# Patient Record
Sex: FEMALE | Race: WHITE | URBAN - NONMETROPOLITAN AREA
[De-identification: names, ages, dates, MRNs, and addresses within clinical notes are randomized per-mention and may not be internally consistent; named-entity substitution may affect disease eponyms.]

---

## 2018-08-29 ENCOUNTER — DOCTOR'S OFFICE (OUTPATIENT)
Dept: URBAN - NONMETROPOLITAN AREA CLINIC 1 | Facility: CLINIC | Age: 48
Setting detail: OPHTHALMOLOGY
End: 2018-08-29
Payer: COMMERCIAL

## 2018-08-29 DIAGNOSIS — H52.4: ICD-10-CM

## 2018-08-29 DIAGNOSIS — H52.13: ICD-10-CM

## 2018-08-29 PROCEDURE — 92015 DETERMINE REFRACTIVE STATE: CPT | Performed by: OPTOMETRIST

## 2018-08-29 PROCEDURE — SELFPAYVIS VISION VISIT SELF PAY: Performed by: OPTOMETRIST

## 2018-08-29 ASSESSMENT — REFRACTION_MANIFEST
OD_SPHERE: -3.25
OS_ADD: +2.00
OU_VA: 20/
OS_VA3: 20/
OS_VA2: 20/20
OD_ADD: +2.00
OS_VA3: 20/
OS_VA1: 20/20
OD_VA3: 20/
OS_VPRISM: BU
OD_VA1: 20/20
OD_VA2: 20/20
OD_VA3: 20/
OS_AXIS: 180
OU_VA: 20/
OD_VA2: 20/
OS_VA1: 20/
OS_CYLINDER: -0.25
OD_CYLINDER: SPH
OS_SPHERE: -2.75
OD_VA1: 20/
OS_VA2: 20/

## 2018-08-29 ASSESSMENT — REFRACTION_AUTOREFRACTION
OD_SPHERE: -3.25
OS_SPHERE: -2.50
OS_CYLINDER: -0.50
OD_CYLINDER: SPH
OS_AXIS: 021

## 2018-08-29 ASSESSMENT — REFRACTION_CURRENTRX
OD_ADD: +1.00
OD_AXIS: 166
OS_SPHERE: -2.75
OS_OVR_VA: 20/
OS_CYLINDER: -0.50
OS_AXIS: 002
OS_OVR_VA: 20/
OD_VPRISM_DIRECTION: SV
OD_CYLINDER: -0.25
OS_VPRISM_DIRECTION: SV
OS_OVR_VA: 20/
OS_SPHERE: -2.25
OS_AXIS: 009
OD_OVR_VA: 20/
OD_SPHERE: -2.75
OD_AXIS: 171
OD_CYLINDER: -0.25
OS_ADD: +1.00
OD_OVR_VA: 20/
OS_VPRISM_DIRECTION: PROGS
OD_SPHERE: -3.00
OD_VPRISM_DIRECTION: PROGS
OS_CYLINDER: -0.25
OD_OVR_VA: 20/

## 2018-08-29 ASSESSMENT — VISUAL ACUITY
OS_BCVA: 20/25
OD_BCVA: 20/20

## 2018-08-29 ASSESSMENT — SPHEQUIV_DERIVED
OS_SPHEQUIV: -2.875
OS_SPHEQUIV: -2.75

## 2019-09-06 ENCOUNTER — DOCTOR'S OFFICE (OUTPATIENT)
Dept: URBAN - NONMETROPOLITAN AREA CLINIC 1 | Facility: CLINIC | Age: 49
Setting detail: OPHTHALMOLOGY
End: 2019-09-06
Payer: COMMERCIAL

## 2019-09-06 DIAGNOSIS — H52.4: ICD-10-CM

## 2019-09-06 DIAGNOSIS — H52.13: ICD-10-CM

## 2019-09-06 PROCEDURE — 92014 COMPRE OPH EXAM EST PT 1/>: CPT | Performed by: OPTOMETRIST

## 2019-09-06 ASSESSMENT — REFRACTION_MANIFEST
OD_VA3: 20/
OD_VA1: 20/
OD_SPHERE: -3.00
OS_ADD: +2.25
OS_VA2: 20/
OS_VPRISM: BU
OS_VA2: 20/20
OD_CYLINDER: -0.25
OD_ADD: +2.25
OS_SPHERE: -2.75
OD_VA2: 20/
OS_VA1: 20/
OU_VA: 20/
OU_VA: 20/
OD_AXIS: 120
OD_VA1: 20/20
OS_AXIS: 180
OD_VA3: 20/
OS_VA3: 20/
OD_VA2: 20/20
OS_VA1: 20/20
OS_CYLINDER: -0.25
OS_VA3: 20/

## 2019-09-06 ASSESSMENT — REFRACTION_CURRENTRX
OS_OVR_VA: 20/
OD_ADD: +1.00
OD_SPHERE: -2.75
OS_CYLINDER: -0.25
OS_AXIS: 180
OS_AXIS: 009
OD_OVR_VA: 20/
OS_CYLINDER: -0.50
OD_CYLINDER: SPH
OS_VPRISM_DIRECTION: PROGS
OS_SPHERE: -2.25
OD_OVR_VA: 20/
OD_CYLINDER: -0.25
OD_VPRISM_DIRECTION: SV
OS_OVR_VA: 20/
OD_SPHERE: -3.25
OD_AXIS: 171
OS_SPHERE: -2.75
OS_OVR_VA: 20/
OS_VPRISM_DIRECTION: SV
OD_OVR_VA: 20/
OS_ADD: +1.00
OD_VPRISM_DIRECTION: PROGS

## 2019-09-06 ASSESSMENT — SPHEQUIV_DERIVED
OD_SPHEQUIV: -3.125
OS_SPHEQUIV: -2.5
OD_SPHEQUIV: -2.75
OS_SPHEQUIV: -2.875

## 2019-09-06 ASSESSMENT — VISUAL ACUITY
OD_BCVA: 20/15-2
OS_BCVA: 20/20-1

## 2019-09-06 ASSESSMENT — REFRACTION_AUTOREFRACTION
OD_AXIS: 117
OD_SPHERE: -2.50
OS_SPHERE: -2.25
OS_CYLINDER: -0.50
OS_AXIS: 177
OD_CYLINDER: -0.50

## 2019-09-06 ASSESSMENT — CONFRONTATIONAL VISUAL FIELD TEST (CVF)
OS_FINDINGS: FULL
OD_FINDINGS: FULL

## 2020-08-07 ENCOUNTER — DOCTOR'S OFFICE (OUTPATIENT)
Dept: URBAN - NONMETROPOLITAN AREA CLINIC 1 | Facility: CLINIC | Age: 50
Setting detail: OPHTHALMOLOGY
End: 2020-08-07
Payer: MEDICARE

## 2020-08-07 DIAGNOSIS — L03.213: ICD-10-CM

## 2020-08-07 DIAGNOSIS — H00.12: ICD-10-CM

## 2020-08-07 PROCEDURE — 92014 COMPRE OPH EXAM EST PT 1/>: CPT | Performed by: OPHTHALMOLOGY

## 2020-08-07 ASSESSMENT — REFRACTION_CURRENTRX
OS_CYLINDER: -0.50
OD_OVR_VA: 20/
OS_SPHERE: -2.25
OS_AXIS: 009
OD_ADD: +1.00
OD_AXIS: 171
OD_VPRISM_DIRECTION: SV
OD_CYLINDER: -0.25
OS_CYLINDER: -0.25
OS_SPHERE: -2.75
OS_AXIS: 180
OS_VPRISM_DIRECTION: SV
OD_OVR_VA: 20/
OS_VPRISM_DIRECTION: PROGS
OS_ADD: +1.00
OD_SPHERE: -3.25
OD_VPRISM_DIRECTION: PROGS
OS_OVR_VA: 20/
OD_CYLINDER: SPH
OD_SPHERE: -2.75
OS_OVR_VA: 20/

## 2020-08-07 ASSESSMENT — CONFRONTATIONAL VISUAL FIELD TEST (CVF)
OD_FINDINGS: FULL
OS_FINDINGS: FULL

## 2020-08-07 ASSESSMENT — LID EXAM ASSESSMENTS
OS_COMMENTS: +1 MGD
OD_COMMENTS: +1 MGD

## 2020-08-07 ASSESSMENT — VISUAL ACUITY
OS_BCVA: 20/25
OD_BCVA: 20/15-2

## 2020-08-07 ASSESSMENT — REFRACTION_AUTOREFRACTION
OD_CYLINDER: -0.50
OD_AXIS: 117
OS_AXIS: 177
OS_CYLINDER: -0.50
OD_SPHERE: -2.50
OS_SPHERE: -2.25

## 2020-08-07 ASSESSMENT — REFRACTION_MANIFEST
OS_ADD: +2.25
OS_CYLINDER: -0.25
OD_AXIS: 120
OS_VPRISM: BU
OD_VA1: 20/20
OS_VA2: 20/20
OS_SPHERE: -2.75
OD_CYLINDER: -0.25
OS_VA1: 20/20
OD_SPHERE: -3.00
OD_ADD: +2.25
OS_AXIS: 180
OD_VA2: 20/20

## 2020-08-07 ASSESSMENT — SPHEQUIV_DERIVED
OD_SPHEQUIV: -2.75
OS_SPHEQUIV: -2.875
OD_SPHEQUIV: -3.125
OS_SPHEQUIV: -2.5

## 2020-08-21 ENCOUNTER — DOCTOR'S OFFICE (OUTPATIENT)
Dept: URBAN - NONMETROPOLITAN AREA CLINIC 1 | Facility: CLINIC | Age: 50
Setting detail: OPHTHALMOLOGY
End: 2020-08-21
Payer: MEDICARE

## 2020-08-21 DIAGNOSIS — L03.213: ICD-10-CM

## 2020-08-21 DIAGNOSIS — H00.12: ICD-10-CM

## 2020-08-21 PROCEDURE — 92012 INTRM OPH EXAM EST PATIENT: CPT | Performed by: OPHTHALMOLOGY

## 2020-08-21 ASSESSMENT — REFRACTION_AUTOREFRACTION
OD_SPHERE: -2.50
OS_AXIS: 177
OD_CYLINDER: -0.50
OD_AXIS: 117
OS_CYLINDER: -0.50
OS_SPHERE: -2.25

## 2020-08-21 ASSESSMENT — SPHEQUIV_DERIVED
OS_SPHEQUIV: -2.5
OS_SPHEQUIV: -2.875
OD_SPHEQUIV: -3.125
OD_SPHEQUIV: -2.75

## 2020-08-21 ASSESSMENT — REFRACTION_MANIFEST
OS_ADD: +2.25
OS_VA2: 20/20
OD_AXIS: 120
OD_VA1: 20/20
OS_VA1: 20/20
OS_CYLINDER: -0.25
OS_AXIS: 180
OS_VPRISM: BU
OD_SPHERE: -3.00
OD_ADD: +2.25
OS_SPHERE: -2.75
OD_VA2: 20/20
OD_CYLINDER: -0.25

## 2020-08-21 ASSESSMENT — REFRACTION_CURRENTRX
OD_VPRISM_DIRECTION: SV
OS_AXIS: 009
OS_ADD: +1.00
OD_SPHERE: -2.75
OS_SPHERE: -2.25
OD_CYLINDER: -0.25
OS_CYLINDER: -0.50
OD_CYLINDER: SPH
OS_CYLINDER: -0.25
OS_OVR_VA: 20/
OD_ADD: +1.00
OD_OVR_VA: 20/
OD_AXIS: 171
OD_OVR_VA: 20/
OD_SPHERE: -3.25
OS_SPHERE: -2.75
OS_VPRISM_DIRECTION: PROGS
OS_OVR_VA: 20/
OS_AXIS: 180
OD_VPRISM_DIRECTION: PROGS
OS_VPRISM_DIRECTION: SV

## 2020-08-21 ASSESSMENT — CONFRONTATIONAL VISUAL FIELD TEST (CVF)
OD_FINDINGS: FULL
OS_FINDINGS: FULL

## 2020-08-21 ASSESSMENT — VISUAL ACUITY
OS_BCVA: 20/20
OD_BCVA: 20/15

## 2020-08-21 ASSESSMENT — LID EXAM ASSESSMENTS
OD_COMMENTS: TR MGD
OS_COMMENTS: +1 MGD

## 2020-10-02 ENCOUNTER — DOCTOR'S OFFICE (OUTPATIENT)
Dept: URBAN - NONMETROPOLITAN AREA CLINIC 1 | Facility: CLINIC | Age: 50
Setting detail: OPHTHALMOLOGY
End: 2020-10-02
Payer: COMMERCIAL

## 2020-10-02 ENCOUNTER — RX ONLY (RX ONLY)
Age: 50
End: 2020-10-02

## 2020-10-02 DIAGNOSIS — H40.041: ICD-10-CM

## 2020-10-02 DIAGNOSIS — H00.12: ICD-10-CM

## 2020-10-02 PROBLEM — L03.213 PERIORBITAL CELLULITIS: Status: RESOLVED | Noted: 2020-08-07 | Resolved: 2020-10-02

## 2020-10-02 PROCEDURE — 99212 OFFICE O/P EST SF 10 MIN: CPT | Performed by: OPHTHALMOLOGY

## 2020-10-02 ASSESSMENT — VISUAL ACUITY
OS_BCVA: 20/25+1
OD_BCVA: 20/20+2

## 2020-10-02 ASSESSMENT — REFRACTION_AUTOREFRACTION
OD_CYLINDER: -0.50
OD_SPHERE: -2.50
OD_AXIS: 117
OS_SPHERE: -2.25
OS_AXIS: 177
OS_CYLINDER: -0.50

## 2020-10-02 ASSESSMENT — REFRACTION_CURRENTRX
OS_SPHERE: -2.75
OS_OVR_VA: 20/
OS_ADD: +1.75
OD_SPHERE: -3.25
OS_VPRISM_DIRECTION: PROGS
OS_AXIS: 180
OD_AXIS: 171
OD_VPRISM_DIRECTION: PROGS
OD_SPHERE: -2.75
OD_ADD: +1.25
OS_VPRISM_DIRECTION: PROGS
OS_SPHERE: -2.25
OS_OVR_VA: 20/
OD_ADD: +1.00
OS_CYLINDER: -0.50
OD_AXIS: 157
OS_AXIS: 009
OD_VPRISM_DIRECTION: PROGS
OD_OVR_VA: 20/
OD_CYLINDER: -0.25
OS_ADD: +1.00
OS_CYLINDER: -0.50
OD_CYLINDER: -0.50
OD_OVR_VA: 20/

## 2020-10-02 ASSESSMENT — REFRACTION_MANIFEST
OD_VA1: 20/20
OS_AXIS: 180
OS_ADD: +2.25
OD_SPHERE: -3.00
OS_VA2: 20/20
OD_VA2: 20/20
OS_SPHERE: -2.75
OS_CYLINDER: -0.25
OD_AXIS: 120
OS_VA1: 20/20
OD_CYLINDER: -0.25
OD_ADD: +2.25
OS_VPRISM: BU

## 2020-10-02 ASSESSMENT — CONFRONTATIONAL VISUAL FIELD TEST (CVF)
OS_FINDINGS: FULL
OD_FINDINGS: FULL

## 2020-10-02 ASSESSMENT — SPHEQUIV_DERIVED
OD_SPHEQUIV: -3.125
OS_SPHEQUIV: -2.5
OD_SPHEQUIV: -2.75
OS_SPHEQUIV: -2.875

## 2020-10-02 ASSESSMENT — LID EXAM ASSESSMENTS
OS_BLEPHARITIS: T
OS_COMMENTS: +1 MGD
OD_BLEPHARITIS: T

## 2020-10-20 ENCOUNTER — RX ONLY (RX ONLY)
Age: 50
End: 2020-10-20

## 2020-10-20 ENCOUNTER — DOCTOR'S OFFICE (OUTPATIENT)
Dept: URBAN - NONMETROPOLITAN AREA CLINIC 1 | Facility: CLINIC | Age: 50
Setting detail: OPHTHALMOLOGY
End: 2020-10-20
Payer: COMMERCIAL

## 2020-10-20 DIAGNOSIS — H00.12: ICD-10-CM

## 2020-10-20 DIAGNOSIS — H40.041: ICD-10-CM

## 2020-10-20 PROCEDURE — 92012 INTRM OPH EXAM EST PATIENT: CPT | Performed by: OPHTHALMOLOGY

## 2020-10-20 ASSESSMENT — REFRACTION_CURRENTRX
OS_CYLINDER: -0.50
OD_VPRISM_DIRECTION: PROGS
OD_SPHERE: -3.25
OD_ADD: +1.25
OS_AXIS: 009
OS_VPRISM_DIRECTION: PROGS
OS_ADD: +1.75
OS_OVR_VA: 20/
OS_ADD: +1.00
OD_CYLINDER: -0.50
OS_SPHERE: -2.75
OD_AXIS: 157
OD_OVR_VA: 20/
OD_CYLINDER: -0.25
OD_SPHERE: -2.75
OS_VPRISM_DIRECTION: PROGS
OD_ADD: +1.00
OD_AXIS: 171
OD_VPRISM_DIRECTION: PROGS
OS_CYLINDER: -0.50
OS_OVR_VA: 20/
OD_OVR_VA: 20/
OS_AXIS: 180
OS_SPHERE: -2.25

## 2020-10-20 ASSESSMENT — REFRACTION_MANIFEST
OD_VA2: 20/20
OD_AXIS: 120
OD_SPHERE: -3.00
OD_VA1: 20/20
OS_VA2: 20/20
OD_CYLINDER: -0.25
OS_SPHERE: -2.75
OS_AXIS: 180
OS_VPRISM: BU
OS_VA1: 20/20
OS_CYLINDER: -0.25
OD_ADD: +2.25
OS_ADD: +2.25

## 2020-10-20 ASSESSMENT — CONFRONTATIONAL VISUAL FIELD TEST (CVF)
OD_FINDINGS: FULL
OS_FINDINGS: FULL

## 2020-10-20 ASSESSMENT — TONOMETRY
OS_IOP_MMHG: 13
OD_IOP_MMHG: 16

## 2020-10-20 ASSESSMENT — SPHEQUIV_DERIVED
OD_SPHEQUIV: -2.75
OD_SPHEQUIV: -3.125
OS_SPHEQUIV: -2.5
OS_SPHEQUIV: -2.875

## 2020-10-20 ASSESSMENT — REFRACTION_AUTOREFRACTION
OD_CYLINDER: -0.50
OS_CYLINDER: -0.50
OS_AXIS: 177
OD_AXIS: 117
OS_SPHERE: -2.25
OD_SPHERE: -2.50

## 2020-10-20 ASSESSMENT — LID EXAM ASSESSMENTS
OD_BLEPHARITIS: T
OS_BLEPHARITIS: T

## 2020-10-20 ASSESSMENT — VISUAL ACUITY
OD_BCVA: 20/20-1
OS_BCVA: 20/20-2

## 2020-11-11 ENCOUNTER — DOCTOR'S OFFICE (OUTPATIENT)
Dept: URBAN - NONMETROPOLITAN AREA CLINIC 1 | Facility: CLINIC | Age: 50
Setting detail: OPHTHALMOLOGY
End: 2020-11-11
Payer: COMMERCIAL

## 2020-11-11 DIAGNOSIS — H52.13: ICD-10-CM

## 2020-11-11 DIAGNOSIS — H52.4: ICD-10-CM

## 2020-11-11 PROCEDURE — 92012 INTRM OPH EXAM EST PATIENT: CPT | Performed by: OPTOMETRIST

## 2020-11-11 PROCEDURE — 92015 DETERMINE REFRACTIVE STATE: CPT | Performed by: OPTOMETRIST

## 2020-11-11 ASSESSMENT — REFRACTION_CURRENTRX
OD_AXIS: 157
OS_ADD: +1.75
OD_AXIS: 171
OS_VPRISM_DIRECTION: PROGS
OS_SPHERE: -2.25
OS_CYLINDER: -0.50
OD_OVR_VA: 20/
OD_SPHERE: -2.75
OD_OVR_VA: 20/
OD_VPRISM_DIRECTION: PROGS
OD_VPRISM_DIRECTION: PROGS
OD_SPHERE: -3.25
OS_ADD: +1.00
OD_CYLINDER: -0.25
OD_ADD: +1.25
OS_OVR_VA: 20/
OS_OVR_VA: 20/
OS_AXIS: 180
OS_VPRISM_DIRECTION: PROGS
OS_SPHERE: -2.75
OD_ADD: +1.00
OD_CYLINDER: -0.50
OS_CYLINDER: -0.50
OS_AXIS: 009

## 2020-11-11 ASSESSMENT — REFRACTION_MANIFEST
OD_SPHERE: -3.00
OS_VPRISM: BU
OD_CYLINDER: -0.25
OD_VA2: 20/20
OS_VA1: 20/20
OS_VA2: 20/20
OD_VA1: 20/20
OS_CYLINDER: -0.25
OS_AXIS: 180
OD_ADD: +2.25
OS_SPHERE: -2.75
OS_ADD: +2.25
OD_AXIS: 120

## 2020-11-11 ASSESSMENT — SPHEQUIV_DERIVED
OS_SPHEQUIV: -1.5
OS_SPHEQUIV: -2.875
OD_SPHEQUIV: -3.25
OD_SPHEQUIV: -3.125

## 2020-11-11 ASSESSMENT — VISUAL ACUITY
OS_BCVA: 20/15-1
OD_BCVA: 20/15-1

## 2020-11-11 ASSESSMENT — REFRACTION_AUTOREFRACTION
OD_SPHERE: -3.25
OS_SPHERE: -1.25
OD_AXIS: 180
OS_CYLINDER: -0.50
OS_AXIS: 25
OD_CYLINDER: 0.00

## 2021-10-29 ENCOUNTER — DOCTOR'S OFFICE (OUTPATIENT)
Dept: URBAN - NONMETROPOLITAN AREA CLINIC 1 | Facility: CLINIC | Age: 51
Setting detail: OPHTHALMOLOGY
End: 2021-10-29
Payer: COMMERCIAL

## 2021-10-29 DIAGNOSIS — H02.88A: ICD-10-CM

## 2021-10-29 DIAGNOSIS — H01.002: ICD-10-CM

## 2021-10-29 DIAGNOSIS — H01.004: ICD-10-CM

## 2021-10-29 DIAGNOSIS — H01.001: ICD-10-CM

## 2021-10-29 DIAGNOSIS — H10.13: ICD-10-CM

## 2021-10-29 DIAGNOSIS — H01.005: ICD-10-CM

## 2021-10-29 DIAGNOSIS — H02.88B: ICD-10-CM

## 2021-10-29 DIAGNOSIS — H40.043: ICD-10-CM

## 2021-10-29 DIAGNOSIS — H40.041: ICD-10-CM

## 2021-10-29 PROCEDURE — 92014 COMPRE OPH EXAM EST PT 1/>: CPT | Performed by: OPHTHALMOLOGY

## 2021-10-29 ASSESSMENT — KERATOMETRY
OS_K1POWER_DIOPTERS: 44.25
OS_AXISANGLE_DEGREES: 73
OD_K2POWER_DIOPTERS: 44.50
OD_K1POWER_DIOPTERS: 44.50
OS_K2POWER_DIOPTERS: 44.75
OD_AXISANGLE_DEGREES: 90

## 2021-10-29 ASSESSMENT — REFRACTION_CURRENTRX
OS_OVR_VA: 20/
OS_AXIS: 008
OS_OVR_VA: 20/
OD_OVR_VA: 20/
OD_VPRISM_DIRECTION: PROGS
OD_OVR_VA: 20/
OD_SPHERE: -3.25
OD_ADD: +1.50
OS_SPHERE: -2.75
OS_VPRISM_DIRECTION: PROGS
OS_AXIS: 009
OS_SPHERE: -2.25
OS_ADD: +1.50
OD_AXIS: 166
OS_ADD: +1.00
OD_ADD: +1.00
OS_CYLINDER: -0.25
OD_CYLINDER: -0.25
OD_AXIS: 171
OD_VPRISM_DIRECTION: PROGS
OD_CYLINDER: -0.25
OS_VPRISM_DIRECTION: PROGS
OS_CYLINDER: -0.50
OD_SPHERE: -2.75

## 2021-10-29 ASSESSMENT — REFRACTION_MANIFEST
OS_VA2: 20/20
OS_AXIS: 180
OD_VA1: 20/20
OS_ADD: +2.25
OS_VPRISM: BU
OD_VA2: 20/20
OD_CYLINDER: -0.25
OD_SPHERE: -3.00
OD_ADD: +2.25
OD_AXIS: 120
OS_SPHERE: -2.75
OS_CYLINDER: -0.25
OS_VA1: 20/20

## 2021-10-29 ASSESSMENT — SPHEQUIV_DERIVED
OS_SPHEQUIV: -2.875
OD_SPHEQUIV: -3.25
OD_SPHEQUIV: -3.125
OS_SPHEQUIV: -3

## 2021-10-29 ASSESSMENT — REFRACTION_AUTOREFRACTION
OS_CYLINDER: 0.00
OS_SPHERE: -3.00
OS_AXIS: 0.00
OD_SPHERE: -3.25
OD_AXIS: 0.00
OD_CYLINDER: 0.00

## 2021-10-29 ASSESSMENT — VISUAL ACUITY
OS_BCVA: 20/20
OD_BCVA: 20/20-1

## 2021-10-29 ASSESSMENT — AXIALLENGTH_DERIVED
OS_AL: 24.4216
OD_AL: 24.5264
OD_AL: 24.4739
OS_AL: 24.3695

## 2021-10-29 ASSESSMENT — LID EXAM ASSESSMENTS
OS_BLEPHARITIS: T
OD_BLEPHARITIS: T

## 2021-10-29 ASSESSMENT — CONFRONTATIONAL VISUAL FIELD TEST (CVF)
OD_FINDINGS: FULL
OS_FINDINGS: FULL

## 2022-01-28 ENCOUNTER — DOCTOR'S OFFICE (OUTPATIENT)
Dept: URBAN - NONMETROPOLITAN AREA CLINIC 1 | Facility: CLINIC | Age: 52
Setting detail: OPHTHALMOLOGY
End: 2022-01-28
Payer: COMMERCIAL

## 2022-01-28 DIAGNOSIS — H52.4: ICD-10-CM

## 2022-01-28 DIAGNOSIS — H52.13: ICD-10-CM

## 2022-01-28 PROCEDURE — 92012 INTRM OPH EXAM EST PATIENT: CPT | Performed by: OPHTHALMOLOGY

## 2022-05-06 ASSESSMENT — REFRACTION_MANIFEST
OS_VPRISM: BU
OD_CYLINDER: -0.50
OD_AXIS: 125
OS_AXIS: 000
OS_SPHERE: -3.00
OS_VA2: 20/20
OD_VA1: 20/20
OS_VA1: 20/20
OU_VA: 20/20
OS_CYLINDER: 0.00
OD_SPHERE: -3.00
OS_ADD: +2.25
OD_ADD: +2.25
OD_VA2: 20/20

## 2022-05-06 ASSESSMENT — REFRACTION_CURRENTRX
OD_OVR_VA: 20/
OS_SPHERE: -2.75
OD_CYLINDER: -0.50
OD_SPHERE: -2.75
OS_CYLINDER: -0.50
OS_AXIS: 009
OD_ADD: +1.00
OS_ADD: +1.00
OD_AXIS: 171
OS_VPRISM_DIRECTION: PROGS
OD_ADD: +1.25
OD_OVR_VA: 20/
OD_AXIS: 170
OD_VPRISM_DIRECTION: PROGS
OD_SPHERE: -3.00
OS_AXIS: 178
OS_SPHERE: -2.25
OS_VPRISM_DIRECTION: PROGS
OD_CYLINDER: -0.25
OS_ADD: +1.25
OS_CYLINDER: -0.50
OS_OVR_VA: 20/
OS_OVR_VA: 20/
OD_VPRISM_DIRECTION: PROGS

## 2022-05-06 ASSESSMENT — REFRACTION_AUTOREFRACTION
OD_AXIS: 148
OD_SPHERE: -2.50
OS_AXIS: 159
OD_CYLINDER: -1.00
OS_CYLINDER: -0.75
OS_SPHERE: -2.50

## 2022-05-06 ASSESSMENT — KERATOMETRY
OD_K2POWER_DIOPTERS: 44.50
OD_K1POWER_DIOPTERS: 44.50
OS_K2POWER_DIOPTERS: 44.75
OD_AXISANGLE_DEGREES: 90
OS_AXISANGLE_DEGREES: 73
OS_K1POWER_DIOPTERS: 44.25

## 2022-05-06 ASSESSMENT — AXIALLENGTH_DERIVED
OS_AL: 24.4216
OS_AL: 24.3695
OD_AL: 24.5264
OD_AL: 24.4216

## 2022-05-06 ASSESSMENT — SPHEQUIV_DERIVED
OD_SPHEQUIV: -3
OS_SPHEQUIV: -2.875
OD_SPHEQUIV: -3.25
OS_SPHEQUIV: -3

## 2022-05-06 ASSESSMENT — VISUAL ACUITY
OS_BCVA: 20/25-1
OD_BCVA: 20/20-2

## 2022-08-18 ENCOUNTER — DOCTOR'S OFFICE (OUTPATIENT)
Dept: URBAN - NONMETROPOLITAN AREA CLINIC 1 | Facility: CLINIC | Age: 52
Setting detail: OPHTHALMOLOGY
End: 2022-08-18
Payer: COMMERCIAL

## 2022-08-18 DIAGNOSIS — G43.101: ICD-10-CM

## 2022-08-18 DIAGNOSIS — H53.2: ICD-10-CM

## 2022-08-18 DIAGNOSIS — H40.001: ICD-10-CM

## 2022-08-18 PROCEDURE — 92083 EXTENDED VISUAL FIELD XM: CPT | Performed by: OPHTHALMOLOGY

## 2022-08-18 PROCEDURE — 76514 ECHO EXAM OF EYE THICKNESS: CPT | Performed by: OPHTHALMOLOGY

## 2022-08-18 PROCEDURE — 99213 OFFICE O/P EST LOW 20 MIN: CPT | Performed by: OPHTHALMOLOGY

## 2022-08-18 ASSESSMENT — REFRACTION_CURRENTRX
OD_CYLINDER: -0.25
OS_OVR_VA: 20/
OD_SPHERE: -3.00
OS_CYLINDER: -0.50
OS_AXIS: 178
OD_OVR_VA: 20/
OD_AXIS: 170
OS_VPRISM_DIRECTION: PROGS
OD_OVR_VA: 20/
OS_CYLINDER: -0.50
OS_ADD: +1.25
OD_AXIS: 171
OS_VPRISM_DIRECTION: PROGS
OD_CYLINDER: -0.50
OS_SPHERE: -2.25
OS_OVR_VA: 20/
OD_VPRISM_DIRECTION: PROGS
OS_SPHERE: -2.75
OD_ADD: +1.25
OS_AXIS: 009
OS_ADD: +1.00
OD_VPRISM_DIRECTION: PROGS
OD_ADD: +1.00
OD_SPHERE: -2.75

## 2022-08-18 ASSESSMENT — REFRACTION_AUTOREFRACTION
OS_AXIS: 095
OD_AXIS: 178
OS_CYLINDER: +0.75
OD_SPHERE: -2.75
OD_CYLINDER: +0.50
OS_SPHERE: -3.00

## 2022-08-18 ASSESSMENT — SPHEQUIV_DERIVED
OD_SPHEQUIV: -2.5
OD_SPHEQUIV: -3.25
OS_SPHEQUIV: -3
OS_SPHEQUIV: -2.625

## 2022-08-18 ASSESSMENT — PACHYMETRY
OD_CT_UM: 515
OD_CT_CORRECTION: 2
OS_CT_CORRECTION: 1
OS_CT_UM: 520

## 2022-08-18 ASSESSMENT — REFRACTION_MANIFEST
OS_CYLINDER: 0.00
OD_SPHERE: -3.00
OD_VA2: 20/20
OS_VA1: 20/20
OD_ADD: +2.25
OD_VA1: 20/20
OU_VA: 20/20
OS_ADD: +2.25
OD_CYLINDER: -0.50
OS_AXIS: 000
OS_SPHERE: -3.00
OS_VPRISM: BU
OS_VA2: 20/20
OD_AXIS: 125

## 2022-08-18 ASSESSMENT — VISUAL ACUITY
OS_BCVA: 20/30-1
OD_BCVA: 20/20-2

## 2022-08-18 ASSESSMENT — CONFRONTATIONAL VISUAL FIELD TEST (CVF)
OD_FINDINGS: FULL
OS_FINDINGS: FULL

## 2022-08-18 ASSESSMENT — KERATOMETRY
OD_AXISANGLE_DEGREES: 90
OD_K2POWER_DIOPTERS: 44.50
OD_K1POWER_DIOPTERS: 44.50
OS_AXISANGLE_DEGREES: 73
OS_K2POWER_DIOPTERS: 44.75
OS_K1POWER_DIOPTERS: 44.25

## 2022-08-18 ASSESSMENT — AXIALLENGTH_DERIVED
OD_AL: 24.5264
OS_AL: 24.266
OS_AL: 24.4216
OD_AL: 24.2146

## 2022-08-18 ASSESSMENT — LID EXAM ASSESSMENTS
OS_BLEPHARITIS: T
OD_BLEPHARITIS: T

## 2022-09-26 ENCOUNTER — DOCTOR'S OFFICE (OUTPATIENT)
Dept: URBAN - NONMETROPOLITAN AREA CLINIC 1 | Facility: CLINIC | Age: 52
Setting detail: OPHTHALMOLOGY
End: 2022-09-26
Payer: COMMERCIAL

## 2022-09-26 DIAGNOSIS — H40.001: ICD-10-CM

## 2022-09-26 DIAGNOSIS — H40.043: ICD-10-CM

## 2022-09-26 DIAGNOSIS — H53.2: ICD-10-CM

## 2022-09-26 DIAGNOSIS — G43.101: ICD-10-CM

## 2022-09-26 DIAGNOSIS — H40.041: ICD-10-CM

## 2022-09-26 PROBLEM — H02.88A MEIBOMIAN GLAND DYSFUNCTION RIGHT EYE, UPPER AND LOWER EYELIDS: Status: ACTIVE | Noted: 2021-10-29

## 2022-09-26 PROBLEM — H52.13 MYOPIA; BOTH EYES: Status: ACTIVE | Noted: 2018-08-29

## 2022-09-26 PROBLEM — H50.21 HYPERTROPIA; RIGHT EYE: Status: ACTIVE | Noted: 2018-08-29

## 2022-09-26 PROBLEM — H01.002 BLEPHARITIS; RIGHT UPPER LID, RIGHT LOWER LID, LEFT UPPER LID, LEFT LOWER LID: Status: ACTIVE | Noted: 2021-10-29

## 2022-09-26 PROBLEM — H02.88B MEIBOMIAN GLAND DYSFUNCTION LEFT EYE, UPPER AND LOWER EYELIDS: Status: ACTIVE | Noted: 2021-10-29

## 2022-09-26 PROBLEM — H01.005 BLEPHARITIS; RIGHT UPPER LID, RIGHT LOWER LID, LEFT UPPER LID, LEFT LOWER LID: Status: ACTIVE | Noted: 2021-10-29

## 2022-09-26 PROBLEM — H10.13 ALLERGIC CONJUNCTIVITS; BOTH EYES: Status: ACTIVE | Noted: 2021-10-29

## 2022-09-26 PROBLEM — H01.001 BLEPHARITIS; RIGHT UPPER LID, RIGHT LOWER LID, LEFT UPPER LID, LEFT LOWER LID: Status: ACTIVE | Noted: 2021-10-29

## 2022-09-26 PROBLEM — H01.004 BLEPHARITIS; RIGHT UPPER LID, RIGHT LOWER LID, LEFT UPPER LID, LEFT LOWER LID: Status: ACTIVE | Noted: 2021-10-29

## 2022-09-26 PROCEDURE — 92133 CPTRZD OPH DX IMG PST SGM ON: CPT | Performed by: OPHTHALMOLOGY

## 2022-09-26 PROCEDURE — 99213 OFFICE O/P EST LOW 20 MIN: CPT | Performed by: OPHTHALMOLOGY

## 2022-09-26 ASSESSMENT — REFRACTION_MANIFEST
OD_AXIS: 125
OD_CYLINDER: -0.50
OS_CYLINDER: 0.00
OD_VA2: 20/20
OS_AXIS: 000
OS_VA2: 20/20
OU_VA: 20/20
OS_VPRISM: BU
OD_ADD: +2.25
OD_VA1: 20/20
OS_VA1: 20/20
OS_SPHERE: -3.00
OD_SPHERE: -3.00
OS_ADD: +2.25

## 2022-09-26 ASSESSMENT — REFRACTION_CURRENTRX
OS_AXIS: 009
OD_ADD: +1.25
OD_VPRISM_DIRECTION: PROGS
OD_CYLINDER: -0.50
OS_CYLINDER: -0.50
OD_CYLINDER: -0.25
OD_SPHERE: -2.75
OS_SPHERE: -2.25
OS_OVR_VA: 20/
OD_AXIS: 171
OD_SPHERE: -3.00
OD_AXIS: 170
OS_ADD: +1.25
OD_VPRISM_DIRECTION: PROGS
OD_OVR_VA: 20/
OS_VPRISM_DIRECTION: PROGS
OS_CYLINDER: -0.50
OD_OVR_VA: 20/
OS_ADD: +1.00
OS_VPRISM_DIRECTION: PROGS
OD_ADD: +1.00
OS_AXIS: 178
OS_SPHERE: -2.75
OS_OVR_VA: 20/

## 2022-09-26 ASSESSMENT — VISUAL ACUITY
OD_BCVA: 20/25
OS_BCVA: 20/25-2

## 2022-09-26 ASSESSMENT — REFRACTION_AUTOREFRACTION
OS_SPHERE: -3.00
OS_CYLINDER: +0.75
OD_CYLINDER: +0.50
OS_AXIS: 095
OD_AXIS: 178
OD_SPHERE: -2.75

## 2022-09-26 ASSESSMENT — AXIALLENGTH_DERIVED
OS_AL: 24.266
OD_AL: 24.5264
OS_AL: 24.4216
OD_AL: 24.2146

## 2022-09-26 ASSESSMENT — KERATOMETRY
OS_K2POWER_DIOPTERS: 44.75
OD_AXISANGLE_DEGREES: 90
OD_K1POWER_DIOPTERS: 44.50
OD_K2POWER_DIOPTERS: 44.50
OS_AXISANGLE_DEGREES: 73
OS_K1POWER_DIOPTERS: 44.25

## 2022-09-26 ASSESSMENT — LID EXAM ASSESSMENTS
OS_BLEPHARITIS: T
OD_BLEPHARITIS: T

## 2022-09-26 ASSESSMENT — SPHEQUIV_DERIVED
OD_SPHEQUIV: -2.5
OD_SPHEQUIV: -3.25
OS_SPHEQUIV: -2.625
OS_SPHEQUIV: -3

## 2022-09-26 ASSESSMENT — CONFRONTATIONAL VISUAL FIELD TEST (CVF)
OD_FINDINGS: FULL
OS_FINDINGS: FULL

## 2022-11-01 ENCOUNTER — DOCTOR'S OFFICE (OUTPATIENT)
Dept: URBAN - NONMETROPOLITAN AREA CLINIC 1 | Facility: CLINIC | Age: 52
Setting detail: OPHTHALMOLOGY
End: 2022-11-01
Payer: COMMERCIAL

## 2022-11-01 DIAGNOSIS — H40.001: ICD-10-CM

## 2022-11-01 DIAGNOSIS — H10.13: ICD-10-CM

## 2022-11-01 DIAGNOSIS — H01.002: ICD-10-CM

## 2022-11-01 DIAGNOSIS — H40.043: ICD-10-CM

## 2022-11-01 DIAGNOSIS — H02.88A: ICD-10-CM

## 2022-11-01 DIAGNOSIS — H01.004: ICD-10-CM

## 2022-11-01 DIAGNOSIS — H01.005: ICD-10-CM

## 2022-11-01 DIAGNOSIS — G43.101: ICD-10-CM

## 2022-11-01 DIAGNOSIS — H53.2: ICD-10-CM

## 2022-11-01 DIAGNOSIS — H02.88B: ICD-10-CM

## 2022-11-01 DIAGNOSIS — H01.001: ICD-10-CM

## 2022-11-01 PROCEDURE — 99213 OFFICE O/P EST LOW 20 MIN: CPT | Performed by: OPHTHALMOLOGY

## 2022-11-01 ASSESSMENT — KERATOMETRY
OD_K2POWER_DIOPTERS: 44.25
OS_AXISANGLE_DEGREES: 080
OS_K2POWER_DIOPTERS: 44.75
OS_K1POWER_DIOPTERS: 44.50
OD_K1POWER_DIOPTERS: 44.25

## 2022-11-01 ASSESSMENT — REFRACTION_CURRENTRX
OD_CYLINDER: -0.25
OD_SPHERE: -2.75
OD_AXIS: 170
OD_OVR_VA: 20/
OS_AXIS: 009
OS_SPHERE: -2.25
OS_ADD: +1.00
OD_OVR_VA: 20/
OD_CYLINDER: -0.50
OS_OVR_VA: 20/
OD_AXIS: 171
OS_VPRISM_DIRECTION: PROGS
OS_AXIS: 178
OS_OVR_VA: 20/
OD_ADD: +1.00
OS_SPHERE: -2.75
OS_CYLINDER: -0.50
OD_VPRISM_DIRECTION: PROGS
OD_SPHERE: -3.00
OD_ADD: +1.25
OS_VPRISM_DIRECTION: PROGS
OS_ADD: +1.25
OS_CYLINDER: -0.50
OD_VPRISM_DIRECTION: PROGS

## 2022-11-01 ASSESSMENT — REFRACTION_MANIFEST
OD_CYLINDER: -0.50
OU_VA: 20/20
OD_AXIS: 125
OS_SPHERE: -3.00
OS_VA2: 20/20
OS_AXIS: 000
OD_VA2: 20/20
OD_ADD: +2.25
OS_ADD: +2.25
OS_VA1: 20/20
OD_VA1: 20/20
OS_VPRISM: BU
OD_SPHERE: -3.00
OS_CYLINDER: 0.00

## 2022-11-01 ASSESSMENT — VISUAL ACUITY
OS_BCVA: 20/20
OD_BCVA: 20/20-1

## 2022-11-01 ASSESSMENT — REFRACTION_AUTOREFRACTION
OD_SPHERE: -2.50
OS_AXIS: 018
OS_SPHERE: -2.25
OD_AXIS: 148
OS_CYLINDER: -0.75
OD_CYLINDER: -0.75

## 2022-11-01 ASSESSMENT — AXIALLENGTH_DERIVED
OS_AL: 24.2176
OD_AL: 24.4679
OS_AL: 24.3725
OD_AL: 24.6261

## 2022-11-01 ASSESSMENT — CONFRONTATIONAL VISUAL FIELD TEST (CVF)
OS_FINDINGS: FULL
OD_FINDINGS: FULL

## 2022-11-01 ASSESSMENT — LID EXAM ASSESSMENTS
OD_BLEPHARITIS: T
OS_BLEPHARITIS: T

## 2022-11-01 ASSESSMENT — SPHEQUIV_DERIVED
OS_SPHEQUIV: -2.625
OD_SPHEQUIV: -2.875
OD_SPHEQUIV: -3.25
OS_SPHEQUIV: -3

## 2023-03-27 ENCOUNTER — DOCTOR'S OFFICE (OUTPATIENT)
Dept: URBAN - NONMETROPOLITAN AREA CLINIC 1 | Facility: CLINIC | Age: 53
Setting detail: OPHTHALMOLOGY
End: 2023-03-27
Payer: COMMERCIAL

## 2023-03-27 DIAGNOSIS — G43.101: ICD-10-CM

## 2023-03-27 DIAGNOSIS — H40.001: ICD-10-CM

## 2023-03-27 DIAGNOSIS — H53.2: ICD-10-CM

## 2023-03-27 DIAGNOSIS — H40.043: ICD-10-CM

## 2023-03-27 PROCEDURE — 99214 OFFICE O/P EST MOD 30 MIN: CPT | Performed by: OPHTHALMOLOGY

## 2023-03-27 PROCEDURE — 92250 FUNDUS PHOTOGRAPHY W/I&R: CPT | Performed by: OPHTHALMOLOGY

## 2023-03-27 ASSESSMENT — REFRACTION_MANIFEST
OU_VA: 20/20
OS_AXIS: 000
OS_ADD: +2.25
OS_VA1: 20/20
OD_VA2: 20/20
OD_CYLINDER: -0.50
OD_VA1: 20/20
OS_CYLINDER: 0.00
OS_VA2: 20/20
OD_SPHERE: -3.00
OS_SPHERE: -3.00
OS_VPRISM: BU
OD_AXIS: 125
OD_ADD: +2.25

## 2023-03-27 ASSESSMENT — REFRACTION_CURRENTRX
OS_AXIS: 180
OS_VPRISM_DIRECTION: PROGS
OD_OVR_VA: 20/
OD_VPRISM_DIRECTION: BF
OS_ADD: +1.00
OD_SPHERE: -3.25
OD_OVR_VA: 20/
OD_ADD: +1.25
OS_CYLINDER: -0.50
OD_CYLINDER: -0.50
OS_VPRISM_DIRECTION: PROGS
OS_AXIS: 178
OS_CYLINDER: 0.00
OD_ADD: +1.00
OD_SPHERE: -2.75
OD_VPRISM_DIRECTION: PROGS
OS_CYLINDER: -0.50
OD_SPHERE: -3.00
OD_CYLINDER: -0.25
OD_ADD: +2.25
OS_VPRISM_DIRECTION: BF
OS_OVR_VA: 20/
OS_ADD: +2.25
OS_OVR_VA: 20/
OS_SPHERE: -3.00
OS_OVR_VA: 20/
OD_AXIS: 171
OS_AXIS: 009
OD_AXIS: 170
OD_VPRISM_DIRECTION: PROGS
OD_AXIS: 123
OS_ADD: +1.25
OS_SPHERE: -2.75
OS_SPHERE: -2.25
OD_CYLINDER: -0.25
OD_OVR_VA: 20/

## 2023-03-27 ASSESSMENT — REFRACTION_AUTOREFRACTION
OD_SPHERE: -3.25
OS_AXIS: 002
OD_AXIS: 165
OS_CYLINDER: -0.75
OS_SPHERE: -2.50
OD_CYLINDER: -0.50

## 2023-03-27 ASSESSMENT — AXIALLENGTH_DERIVED
OD_AL: 24.7327
OS_AL: 24.3206
OD_AL: 24.6261
OS_AL: 24.3725

## 2023-03-27 ASSESSMENT — CONFRONTATIONAL VISUAL FIELD TEST (CVF)
OD_FINDINGS: FULL
OS_FINDINGS: FULL

## 2023-03-27 ASSESSMENT — VISUAL ACUITY
OD_BCVA: 20/20
OS_BCVA: 20/25-2

## 2023-03-27 ASSESSMENT — LID EXAM ASSESSMENTS
OD_BLEPHARITIS: T
OS_BLEPHARITIS: T

## 2023-03-27 ASSESSMENT — KERATOMETRY
OS_AXISANGLE_DEGREES: 080
OD_K1POWER_DIOPTERS: 44.25
OD_K2POWER_DIOPTERS: 44.25
OS_K1POWER_DIOPTERS: 44.50
OS_K2POWER_DIOPTERS: 44.75

## 2023-03-27 ASSESSMENT — SPHEQUIV_DERIVED
OS_SPHEQUIV: -3
OD_SPHEQUIV: -3.5
OD_SPHEQUIV: -3.25
OS_SPHEQUIV: -2.875

## 2023-04-17 ENCOUNTER — DOCTOR'S OFFICE (OUTPATIENT)
Dept: URBAN - NONMETROPOLITAN AREA CLINIC 1 | Facility: CLINIC | Age: 53
Setting detail: OPHTHALMOLOGY
End: 2023-04-17
Payer: COMMERCIAL

## 2023-04-17 DIAGNOSIS — H52.13: ICD-10-CM

## 2023-04-17 DIAGNOSIS — H52.4: ICD-10-CM

## 2023-04-17 PROCEDURE — 92012 INTRM OPH EXAM EST PATIENT: CPT | Performed by: OPTOMETRIST

## 2023-04-17 ASSESSMENT — REFRACTION_CURRENTRX
OD_VPRISM_DIRECTION: PROGS
OD_ADD: +2.25
OD_CYLINDER: -0.25
OD_VPRISM_DIRECTION: PROGS
OS_OVR_VA: 20/
OS_VPRISM_DIRECTION: PROGS
OD_ADD: +2.25
OD_AXIS: 125
OS_VPRISM_DIRECTION: PROGS
OS_VPRISM_DIRECTION: BF
OS_CYLINDER: -0.50
OS_AXIS: 180
OD_AXIS: 171
OS_OVR_VA: 20/
OS_ADD: +1.00
OS_AXIS: 009
OD_OVR_VA: 20/
OD_VPRISM_DIRECTION: BF
OS_SPHERE: -3.00
OD_OVR_VA: 20/
OS_CYLINDER: 0.00
OD_SPHERE: -3.00
OS_SPHERE: -3.00
OD_ADD: +1.00
OS_ADD: +2.25
OD_CYLINDER: -0.25
OS_ADD: +2.25
OD_OVR_VA: 20/
OS_OVR_VA: 20/
OD_SPHERE: -2.75
OS_CYLINDER: SPH
OS_SPHERE: -2.25
OD_CYLINDER: -0.50
OD_AXIS: 123
OD_SPHERE: -3.25

## 2023-04-17 ASSESSMENT — REFRACTION_MANIFEST
OS_SPHERE: -3.00
OS_VA2: 20/20
OD_VA2: 20/20
OS_CYLINDER: SPH
OD_ADD: +2.25
OD_AXIS: 125
OD_CYLINDER: -0.50
OD_VA1: 20/20
OS_ADD: +2.25
OS_VA1: 20/20-2
OD_SPHERE: -3.00
OU_VA: 20/20
OS_VPRISM: BU

## 2023-04-17 ASSESSMENT — AXIALLENGTH_DERIVED
OD_AL: 24.7327
OD_AL: 24.6261
OS_AL: 24.3206

## 2023-04-17 ASSESSMENT — KERATOMETRY
OD_K2POWER_DIOPTERS: 44.25
OS_K2POWER_DIOPTERS: 44.75
OS_K1POWER_DIOPTERS: 44.50
OS_AXISANGLE_DEGREES: 080
OD_K1POWER_DIOPTERS: 44.25

## 2023-04-17 ASSESSMENT — REFRACTION_AUTOREFRACTION
OS_CYLINDER: -0.75
OD_CYLINDER: -0.50
OD_SPHERE: -3.25
OD_AXIS: 165
OS_SPHERE: -2.50
OS_AXIS: 002

## 2023-04-17 ASSESSMENT — SPHEQUIV_DERIVED
OD_SPHEQUIV: -3.25
OD_SPHEQUIV: -3.5
OS_SPHEQUIV: -2.875

## 2023-04-17 ASSESSMENT — CONFRONTATIONAL VISUAL FIELD TEST (CVF)
OD_FINDINGS: FULL
OS_FINDINGS: FULL

## 2023-04-17 ASSESSMENT — VISUAL ACUITY
OS_BCVA: 20/25-2
OD_BCVA: 20/30-2

## 2023-04-17 ASSESSMENT — LID EXAM ASSESSMENTS
OD_BLEPHARITIS: T
OS_BLEPHARITIS: T

## 2023-06-08 ENCOUNTER — HOSPITAL ENCOUNTER (EMERGENCY)
Facility: HOSPITAL | Age: 53
Discharge: HOME | End: 2023-06-08
Attending: EMERGENCY MEDICINE | Admitting: EMERGENCY MEDICINE
Payer: COMMERCIAL

## 2023-06-08 VITALS
OXYGEN SATURATION: 100 % | TEMPERATURE: 97.7 F | RESPIRATION RATE: 18 BRPM | DIASTOLIC BLOOD PRESSURE: 87 MMHG | SYSTOLIC BLOOD PRESSURE: 125 MMHG | HEART RATE: 66 BPM

## 2023-06-08 DIAGNOSIS — S05.02XA ABRASION OF LEFT CORNEA, INITIAL ENCOUNTER: Primary | ICD-10-CM

## 2023-06-08 RX ORDER — TETRACAINE HYDROCHLORIDE 5 MG/ML
2 SOLUTION OPHTHALMIC ONCE
Status: COMPLETED | OUTPATIENT
Start: 2023-06-08 | End: 2023-06-08

## 2023-06-08 RX ORDER — TOBRAMYCIN 3 MG/ML
1 SOLUTION/ DROPS OPHTHALMIC ONCE
Status: COMPLETED | OUTPATIENT
Start: 2023-06-08 | End: 2023-06-08

## 2023-06-08 RX ORDER — TOBRAMYCIN 3 MG/ML
1 SOLUTION/ DROPS OPHTHALMIC
Qty: 5 ML | Refills: 0 | Status: SHIPPED | OUTPATIENT
Start: 2023-06-08

## 2023-06-08 RX ADMIN — FLUORESCEIN SODIUM 1 STRIP: 1 STRIP OPHTHALMIC at 18:43

## 2023-06-08 RX ADMIN — TETRACAINE HYDROCHLORIDE 2 DROP: 5 SOLUTION OPHTHALMIC at 18:43

## 2023-06-08 RX ADMIN — TOBRAMYCIN 1 DROP: 3 SOLUTION/ DROPS OPHTHALMIC at 18:45

## 2023-06-08 NOTE — ED PROVIDER NOTES
History  Chief Complaint   Patient presents with   • Eye Pain     Patient was stuck in left eye with a piece of hay  Patient having pain in left eye and headache       59-year-old female to the emergency department complaining of left eye pain and headache  About an hour prior to arrival, patient was struck in the left eye with a piece of hay  Denies change in vision  Just mostly complaining of the pain  No other injury  Eye Pain  Location:  OS  Quality:  Pain  Severity:  Moderate  Onset quality:  Sudden  Timing:  Constant  Chronicity:  New  Context:  Struck an eye with a piece of hay  Associated symptoms: headaches        None       History reviewed  No pertinent past medical history  History reviewed  No pertinent surgical history  History reviewed  No pertinent family history  I have reviewed and agree with the history as documented  E-Cigarette/Vaping   • E-Cigarette Use Never User      E-Cigarette/Vaping Substances     Social History     Tobacco Use   • Smoking status: Never   • Smokeless tobacco: Never   Vaping Use   • Vaping Use: Never used   Substance Use Topics   • Alcohol use: Not Currently   • Drug use: Not Currently       Review of Systems   Eyes: Positive for pain  Negative for photophobia, redness and visual disturbance  Neurological: Positive for headaches  All other systems reviewed and are negative  Physical Exam  Physical Exam  Vitals and nursing note reviewed  Constitutional:       General: She is not in acute distress  Appearance: She is obese  She is not toxic-appearing  HENT:      Right Ear: External ear normal       Left Ear: External ear normal       Nose: Nose normal       Mouth/Throat:      Mouth: Mucous membranes are moist    Eyes:      General: Lids are normal  Lids are everted, no foreign bodies appreciated  Vision grossly intact  Right eye: No discharge  Left eye: No discharge  Extraocular Movements: Extraocular movements intact  Right eye: Normal extraocular motion and no nystagmus  Left eye: Normal extraocular motion and no nystagmus  Conjunctiva/sclera:      Left eye: Left conjunctiva is not injected  No exudate  Pupils: Pupils are equal, round, and reactive to light  Left eye: Fluorescein uptake present  Slit lamp exam:     Right eye: Anterior chamber quiet  Left eye: Anterior chamber quiet  Visual Fields: Right eye visual fields normal and left eye visual fields normal         Comments: Linear abrasion as noted  Visual acuity in affected eyes 20/15  Pulmonary:      Effort: Pulmonary effort is normal  No respiratory distress  Abdominal:      General: Abdomen is flat  Neurological:      Mental Status: She is alert  Vital Signs  ED Triage Vitals [06/08/23 1830]   Temperature Pulse Respirations Blood Pressure SpO2   97 7 °F (36 5 °C) 66 18 125/87 100 %      Temp Source Heart Rate Source Patient Position - Orthostatic VS BP Location FiO2 (%)   Temporal Monitor Sitting Right arm --      Pain Score       --           Vitals:    06/08/23 1830   BP: 125/87   Pulse: 66   Patient Position - Orthostatic VS: Sitting         Visual Acuity      ED Medications  Medications   fluorescein sodium sterile ophthalmic strip 1 strip (1 strip Right Eye Given 6/8/23 1843)   tetracaine 0 5 % ophthalmic solution 2 drop (2 drops Right Eye Given 6/8/23 1843)   tobramycin (TOBREX) 0 3 % ophthalmic solution 1 drop (1 drop Left Eye Given 6/8/23 1845)       Diagnostic Studies  Results Reviewed     None                 No orders to display              Procedures  Procedures         ED Course                                             Medical Decision Making  Abrasion of left cornea, initial encounter: self-limited or minor problem  Risk  Prescription drug management            Disposition  Final diagnoses:   Abrasion of left cornea, initial encounter     Time reflects when diagnosis was documented in both MDM as applicable and the Disposition within this note     Time User Action Codes Description Comment    6/8/2023  6:44 PM Osman Aguilar Add [S05  02XA] Abrasion of left cornea, initial encounter       ED Disposition     ED Disposition   Discharge    Condition   Stable    Date/Time   Thu Jun 8, 2023  6:42 PM    Comment   Mehdi Woodall discharge to home/self care  Follow-up Information     Follow up With Specialties Details Why Contact Info    [facility] In 1 day for recheck [address] [facility]  [phone]            Patient's Medications   Discharge Prescriptions    TOBRAMYCIN (TOBREX) 0 3 % SOLN    Administer 1 drop into the left eye every 4 (four) hours while awake       Start Date: 6/8/2023  End Date: --       Order Dose: 1 drop       Quantity: 5 mL    Refills: 0       No discharge procedures on file      PDMP Review     None          ED Provider  Electronically Signed by           Mj Vazquez DO  06/08/23 4963

## 2023-06-08 NOTE — DISCHARGE INSTRUCTIONS
Use eyedrops as prescribed  Turn with any worsening  Follow-up with your ophthalmologist tomorrow  Thank you for choosing the emergency department at [facility]  We appreciated the opportunity and privilege to address your healthcare needs  We remain available to you should you require additional evaluation or assistance  We value your feedback and would appreciate the opportunity to address anything you identified as an opportunity to improve or where we excelled  If there are colleagues who deserve special recognition, please let us know! We hope you are feeling better soon! Please also note that sometimes there are subtle abnormalities in your lab values that you may observe when you access your record online  These are frequently not worrisome and if they are of concern we will have discussed them with you  However, we always encourage that you discuss any concerns you may have or observe on your record with your primary care provider  Please also be aware that voice transcription will occasionally recognize words or grammar differently than what was spoken

## 2023-06-09 ENCOUNTER — RX ONLY (RX ONLY)
Age: 53
End: 2023-06-09

## 2023-06-09 ENCOUNTER — DOCTOR'S OFFICE (OUTPATIENT)
Dept: URBAN - NONMETROPOLITAN AREA CLINIC 1 | Facility: CLINIC | Age: 53
Setting detail: OPHTHALMOLOGY
End: 2023-06-09
Payer: COMMERCIAL

## 2023-06-09 DIAGNOSIS — H11.89: ICD-10-CM

## 2023-06-09 PROCEDURE — 99214 OFFICE O/P EST MOD 30 MIN: CPT | Performed by: OPHTHALMOLOGY

## 2023-06-09 ASSESSMENT — REFRACTION_CURRENTRX
OD_ADD: +1.00
OD_AXIS: 125
OS_SPHERE: -2.25
OD_SPHERE: -3.25
OD_VPRISM_DIRECTION: BF
OD_AXIS: 123
OS_VPRISM_DIRECTION: PROGS
OD_CYLINDER: -0.25
OD_SPHERE: -2.75
OD_OVR_VA: 20/
OS_CYLINDER: -0.50
OD_VPRISM_DIRECTION: PROGS
OD_CYLINDER: -0.50
OS_OVR_VA: 20/
OS_SPHERE: -3.00
OD_ADD: +2.25
OS_OVR_VA: 20/
OS_AXIS: 009
OD_OVR_VA: 20/
OS_ADD: +2.25
OS_VPRISM_DIRECTION: PROGS
OD_ADD: +2.25
OD_AXIS: 171
OD_OVR_VA: 20/
OS_ADD: +1.00
OS_VPRISM_DIRECTION: BF
OS_ADD: +2.25
OD_VPRISM_DIRECTION: PROGS
OS_AXIS: 180
OD_SPHERE: -3.00
OS_OVR_VA: 20/
OS_CYLINDER: SPH
OD_CYLINDER: -0.25
OS_CYLINDER: 0.00
OS_SPHERE: -3.00

## 2023-06-09 ASSESSMENT — AXIALLENGTH_DERIVED: OD_AL: 24.4769

## 2023-06-09 ASSESSMENT — REFRACTION_MANIFEST
OS_VPRISM: BU
OS_SPHERE: -3.00
OD_VA2: 20/20
OD_AXIS: 125
OD_ADD: +2.25
OS_VA2: 20/20
OU_VA: 20/20
OD_CYLINDER: -0.50
OD_SPHERE: -3.00
OD_VA1: 20/20
OS_CYLINDER: SPH
OS_ADD: +2.25
OS_VA1: 20/20-2

## 2023-06-09 ASSESSMENT — KERATOMETRY
OS_AXISANGLE_DEGREES: 067
OS_K2POWER_DIOPTERS: 44.75
OD_AXISANGLE_DEGREES: 081
OD_K1POWER_DIOPTERS: 44.50
OS_K1POWER_DIOPTERS: 44.50
OD_K2POWER_DIOPTERS: 44.75

## 2023-06-09 ASSESSMENT — REFRACTION_AUTOREFRACTION
OS_SPHERE: --3.00
OS_CYLINDER: -0.25
OD_SPHERE: -3.50
OS_AXIS: 179

## 2023-06-09 ASSESSMENT — LID EXAM ASSESSMENTS
OD_BLEPHARITIS: T
OS_BLEPHARITIS: T

## 2023-06-09 ASSESSMENT — CONFRONTATIONAL VISUAL FIELD TEST (CVF)
OD_FINDINGS: FULL
OS_FINDINGS: FULL

## 2023-06-09 ASSESSMENT — VISUAL ACUITY
OS_BCVA: 20/20-2
OD_BCVA: 20/20

## 2023-06-09 ASSESSMENT — SPHEQUIV_DERIVED: OD_SPHEQUIV: -3.25

## 2023-09-27 ENCOUNTER — DOCTOR'S OFFICE (OUTPATIENT)
Dept: URBAN - NONMETROPOLITAN AREA CLINIC 1 | Facility: CLINIC | Age: 53
Setting detail: OPHTHALMOLOGY
End: 2023-09-27
Payer: COMMERCIAL

## 2023-09-27 DIAGNOSIS — H40.001: ICD-10-CM

## 2023-09-27 DIAGNOSIS — H11.89: ICD-10-CM

## 2023-09-27 DIAGNOSIS — H40.043: ICD-10-CM

## 2023-09-27 DIAGNOSIS — H53.2: ICD-10-CM

## 2023-09-27 DIAGNOSIS — H40.041: ICD-10-CM

## 2023-09-27 DIAGNOSIS — H11.153: ICD-10-CM

## 2023-09-27 PROCEDURE — 76514 ECHO EXAM OF EYE THICKNESS: CPT | Performed by: OPHTHALMOLOGY

## 2023-09-27 PROCEDURE — 99214 OFFICE O/P EST MOD 30 MIN: CPT | Performed by: OPHTHALMOLOGY

## 2023-09-27 PROCEDURE — 92083 EXTENDED VISUAL FIELD XM: CPT | Performed by: OPHTHALMOLOGY

## 2023-09-27 ASSESSMENT — REFRACTION_CURRENTRX
OD_CYLINDER: -0.25
OD_SPHERE: -3.25
OS_SPHERE: -3.00
OD_OVR_VA: 20/
OD_SPHERE: -3.25
OS_CYLINDER: 0.00
OD_CYLINDER: -0.25
OS_ADD: +2.25
OS_VPRISM_DIRECTION: PROGS
OD_VPRISM_DIRECTION: PROGS
OS_VPRISM_DIRECTION: BF
OD_AXIS: 171
OS_ADD: +2.25
OS_AXIS: 009
OD_VPRISM_DIRECTION: PROGS
OD_OVR_VA: 20/
OS_SPHERE: -3.00
OS_SPHERE: -2.25
OD_SPHERE: -2.75
OD_VPRISM_DIRECTION: BF
OS_CYLINDER: 0.00
OS_AXIS: 180
OS_ADD: +1.00
OD_CYLINDER: -0.25
OS_OVR_VA: 20/
OD_ADD: +1.00
OS_OVR_VA: 20/
OD_OVR_VA: 20/
OD_AXIS: 123
OD_AXIS: 122
OD_ADD: +2.25
OS_VPRISM_DIRECTION: PROGS
OS_AXIS: 180
OS_CYLINDER: -0.50
OD_ADD: +2.25
OS_OVR_VA: 20/

## 2023-09-27 ASSESSMENT — AXIALLENGTH_DERIVED
OD_AL: 24.4769
OS_AL: 24.2176
OD_AL: 24.269

## 2023-09-27 ASSESSMENT — PACHYMETRY
OD_CT_CORRECTION: 2
OS_CT_CORRECTION: 1
OS_CT_UM: 520
OD_CT_UM: 515

## 2023-09-27 ASSESSMENT — SPHEQUIV_DERIVED
OD_SPHEQUIV: -3.25
OD_SPHEQUIV: -2.75
OS_SPHEQUIV: -2.625

## 2023-09-27 ASSESSMENT — KERATOMETRY
OD_AXISANGLE_DEGREES: 081
OS_K1POWER_DIOPTERS: 44.50
OS_K2POWER_DIOPTERS: 44.75
OD_K2POWER_DIOPTERS: 44.75
OS_AXISANGLE_DEGREES: 067
OD_K1POWER_DIOPTERS: 44.50

## 2023-09-27 ASSESSMENT — REFRACTION_MANIFEST
OU_VA: 20/20
OS_CYLINDER: SPH
OD_SPHERE: -3.00
OS_VA1: 20/20-2
OD_ADD: +2.25
OS_ADD: +2.25
OD_VA1: 20/20
OD_CYLINDER: -0.50
OD_AXIS: 125
OS_VPRISM: BU
OS_VA2: 20/20
OS_SPHERE: -3.00
OD_VA2: 20/20

## 2023-09-27 ASSESSMENT — LID EXAM ASSESSMENTS
OS_BLEPHARITIS: T
OD_BLEPHARITIS: T

## 2023-09-27 ASSESSMENT — CONFRONTATIONAL VISUAL FIELD TEST (CVF)
OS_FINDINGS: FULL
OD_FINDINGS: FULL

## 2023-09-27 ASSESSMENT — VISUAL ACUITY
OD_BCVA: 20/20-1
OS_BCVA: 20/20

## 2023-09-27 ASSESSMENT — REFRACTION_AUTOREFRACTION
OS_AXIS: 009
OS_SPHERE: -2.50
OD_CYLINDER: 0.00
OD_SPHERE: -2.75
OS_CYLINDER: -0.25

## 2023-09-27 ASSESSMENT — TONOMETRY
OD_IOP_MMHG: 20
OS_IOP_MMHG: 18

## 2023-10-27 ENCOUNTER — HOSPITAL ENCOUNTER (OUTPATIENT)
Dept: CT IMAGING | Facility: HOSPITAL | Age: 53
Discharge: HOME | End: 2023-10-27
Payer: COMMERCIAL

## 2023-10-27 DIAGNOSIS — R51.9 HEADACHE, UNSPECIFIED: ICD-10-CM

## 2023-10-27 PROCEDURE — G1004 CDSM NDSC: HCPCS

## 2023-10-27 PROCEDURE — 70450 CT HEAD/BRAIN W/O DYE: CPT

## 2024-03-27 ENCOUNTER — DOCTOR'S OFFICE (OUTPATIENT)
Dept: URBAN - NONMETROPOLITAN AREA CLINIC 1 | Facility: CLINIC | Age: 54
Setting detail: OPHTHALMOLOGY
End: 2024-03-27
Payer: COMMERCIAL

## 2024-03-27 DIAGNOSIS — H40.041: ICD-10-CM

## 2024-03-27 DIAGNOSIS — H40.001: ICD-10-CM

## 2024-03-27 DIAGNOSIS — H53.2: ICD-10-CM

## 2024-03-27 DIAGNOSIS — H43.812: ICD-10-CM

## 2024-03-27 DIAGNOSIS — H40.043: ICD-10-CM

## 2024-03-27 PROCEDURE — 99214 OFFICE O/P EST MOD 30 MIN: CPT | Performed by: OPHTHALMOLOGY

## 2024-03-27 ASSESSMENT — LID EXAM ASSESSMENTS
OD_BLEPHARITIS: T
OS_BLEPHARITIS: T

## 2024-04-15 ENCOUNTER — HOSPITAL ENCOUNTER (OUTPATIENT)
Dept: RADIOLOGY | Facility: HOSPITAL | Age: 54
Discharge: HOME | End: 2024-04-15
Payer: COMMERCIAL

## 2024-04-15 DIAGNOSIS — L08.9 LOCAL INFECTION OF THE SKIN AND SUBCUTANEOUS TISSUE, UNSPECIFIED: ICD-10-CM

## 2024-04-15 PROCEDURE — 73140 X-RAY EXAM OF FINGER(S): CPT

## 2024-04-23 ENCOUNTER — DOCTOR'S OFFICE (OUTPATIENT)
Dept: URBAN - NONMETROPOLITAN AREA CLINIC 1 | Facility: CLINIC | Age: 54
Setting detail: OPHTHALMOLOGY
End: 2024-04-23
Payer: COMMERCIAL

## 2024-04-23 DIAGNOSIS — H52.13: ICD-10-CM

## 2024-04-23 DIAGNOSIS — H52.4: ICD-10-CM

## 2024-04-23 PROCEDURE — 92014 COMPRE OPH EXAM EST PT 1/>: CPT | Performed by: OPTOMETRIST

## 2024-04-23 ASSESSMENT — LID EXAM ASSESSMENTS
OS_BLEPHARITIS: T
OD_BLEPHARITIS: T

## 2024-07-16 ENCOUNTER — DOCTOR'S OFFICE (OUTPATIENT)
Dept: URBAN - NONMETROPOLITAN AREA CLINIC 1 | Facility: CLINIC | Age: 54
Setting detail: OPHTHALMOLOGY
End: 2024-07-16
Payer: COMMERCIAL

## 2024-07-16 DIAGNOSIS — H25.13: ICD-10-CM

## 2024-07-16 DIAGNOSIS — H01.004: ICD-10-CM

## 2024-07-16 DIAGNOSIS — H01.001: ICD-10-CM

## 2024-07-16 DIAGNOSIS — H10.13: ICD-10-CM

## 2024-07-16 DIAGNOSIS — H01.002: ICD-10-CM

## 2024-07-16 DIAGNOSIS — H01.005: ICD-10-CM

## 2024-07-16 PROBLEM — H10.12 ALLERGIC CONJUNCTIVITIS; RIGHT EYE, LEFT EYE: Status: ACTIVE | Noted: 2024-07-16

## 2024-07-16 PROBLEM — H10.11 ALLERGIC CONJUNCTIVITIS; RIGHT EYE, LEFT EYE: Status: ACTIVE | Noted: 2024-07-16

## 2024-07-16 PROCEDURE — 99213 OFFICE O/P EST LOW 20 MIN: CPT | Performed by: OPHTHALMOLOGY

## 2024-07-16 ASSESSMENT — CONFRONTATIONAL VISUAL FIELD TEST (CVF)
OD_FINDINGS: FULL
OS_FINDINGS: FULL

## 2024-07-16 ASSESSMENT — LID EXAM ASSESSMENTS
OD_BLEPHARITIS: T
OS_BLEPHARITIS: T

## 2024-07-20 ASSESSMENT — REFRACTION_MANIFEST
OD_VA2: 20/20
OS_SPHERE: -3.00
OD_ADD: +2.25
OS_VA2: 20/20
OD_VA1: 20/20
OU_VA: 20/20
OS_ADD: +2.25
OD_AXIS: 125
OS_VPRISM: BU
OS_CYLINDER: SPH
OS_VA1: 20/20-2
OD_SPHERE: -3.00
OD_CYLINDER: -0.50

## 2024-07-20 ASSESSMENT — REFRACTION_CURRENTRX
OS_ADD: +2.00
OD_CYLINDER: -0.25
OD_AXIS: 171
OS_ADD: +2.25
OD_ADD: +1.00
OD_ADD: +2.00
OS_AXIS: 180
OS_CYLINDER: SPH
OS_OVR_VA: 20/
OS_VPRISM_DIRECTION: PROGS
OS_OVR_VA: 20/
OD_SPHERE: -3.25
OD_SPHERE: -3.00
OD_AXIS: 123
OD_AXIS: 110
OD_OVR_VA: 20/
OS_AXIS: 180
OS_AXIS: 009
OD_OVR_VA: 20/
OS_OVR_VA: 20/
OD_SPHERE: -2.75
OS_CYLINDER: 0.00
OD_VPRISM_DIRECTION: PROGS
OD_VPRISM_DIRECTION: PROGS
OS_SPHERE: -2.25
OD_OVR_VA: 20/
OS_SPHERE: -3.00
OD_ADD: +2.25
OD_CYLINDER: -0.25
OS_ADD: +1.00
OS_SPHERE: -3.00
OS_VPRISM_DIRECTION: BF
OD_CYLINDER: -0.50
OD_VPRISM_DIRECTION: BF
OS_CYLINDER: -0.50
OS_VPRISM_DIRECTION: PROGS

## 2024-07-20 ASSESSMENT — KERATOMETRY
OD_AXISANGLE_DEGREES: 081
OD_K1POWER_DIOPTERS: 44.50
OD_K2POWER_DIOPTERS: 44.75
OS_AXISANGLE_DEGREES: 067
OS_K1POWER_DIOPTERS: 44.50
OS_K2POWER_DIOPTERS: 44.75

## 2024-08-27 ENCOUNTER — DOCTOR'S OFFICE (OUTPATIENT)
Dept: URBAN - NONMETROPOLITAN AREA CLINIC 1 | Facility: CLINIC | Age: 54
Setting detail: OPHTHALMOLOGY
End: 2024-08-27
Payer: COMMERCIAL

## 2024-08-27 ENCOUNTER — RX ONLY (RX ONLY)
Age: 54
End: 2024-08-27

## 2024-08-27 DIAGNOSIS — H01.001: ICD-10-CM

## 2024-08-27 DIAGNOSIS — H01.005: ICD-10-CM

## 2024-08-27 DIAGNOSIS — H01.002: ICD-10-CM

## 2024-08-27 DIAGNOSIS — H40.001: ICD-10-CM

## 2024-08-27 DIAGNOSIS — H25.13: ICD-10-CM

## 2024-08-27 DIAGNOSIS — H01.004: ICD-10-CM

## 2024-08-27 DIAGNOSIS — H10.13: ICD-10-CM

## 2024-08-27 PROCEDURE — 99213 OFFICE O/P EST LOW 20 MIN: CPT | Performed by: OPHTHALMOLOGY

## 2024-08-27 ASSESSMENT — CONFRONTATIONAL VISUAL FIELD TEST (CVF)
OD_FINDINGS: FULL
OS_FINDINGS: FULL

## 2024-08-27 ASSESSMENT — LID EXAM ASSESSMENTS
OD_BLEPHARITIS: T
OS_BLEPHARITIS: T

## 2024-08-31 ENCOUNTER — HOSPITAL ENCOUNTER (EMERGENCY)
Facility: HOSPITAL | Age: 54
Discharge: HOME | End: 2024-08-31
Attending: STUDENT IN AN ORGANIZED HEALTH CARE EDUCATION/TRAINING PROGRAM
Payer: COMMERCIAL

## 2024-08-31 VITALS
RESPIRATION RATE: 20 BRPM | DIASTOLIC BLOOD PRESSURE: 98 MMHG | TEMPERATURE: 97.5 F | SYSTOLIC BLOOD PRESSURE: 137 MMHG | HEART RATE: 67 BPM | OXYGEN SATURATION: 99 %

## 2024-08-31 DIAGNOSIS — R39.15 URINARY URGENCY: ICD-10-CM

## 2024-08-31 DIAGNOSIS — R35.0 URINARY FREQUENCY: Primary | ICD-10-CM

## 2024-08-31 DIAGNOSIS — R30.0 DYSURIA: ICD-10-CM

## 2024-08-31 LAB
AMORPH PHOS CRY URNS QL MICRO: NORMAL /HPF
BACTERIA UR QL AUTO: NORMAL /HPF
BILIRUB UR QL STRIP: ABNORMAL
CLARITY UR: CLEAR
COLOR UR: YELLOW
GLUCOSE UR STRIP-MCNC: NEGATIVE MG/DL
HGB UR QL STRIP.AUTO: NEGATIVE
KETONES UR STRIP-MCNC: NEGATIVE MG/DL
LEUKOCYTE ESTERASE UR QL STRIP: ABNORMAL
NITRITE UR QL STRIP: NEGATIVE
NON-SQ EPI CELLS URNS QL MICRO: NORMAL /HPF
PH UR STRIP.AUTO: 7 [PH]
PROT UR STRIP-MCNC: ABNORMAL MG/DL
RBC #/AREA URNS AUTO: NORMAL /HPF
SP GR UR STRIP.AUTO: 1.02 (ref 1–1.03)
TRI-PHOS CRY URNS QL MICRO: NORMAL /HPF
UROBILINOGEN UR QL STRIP.AUTO: 0.2 E.U./DL
WBC #/AREA URNS AUTO: NORMAL /HPF

## 2024-08-31 PROCEDURE — 87077 CULTURE AEROBIC IDENTIFY: CPT | Performed by: STUDENT IN AN ORGANIZED HEALTH CARE EDUCATION/TRAINING PROGRAM

## 2024-08-31 PROCEDURE — 99284 EMERGENCY DEPT VISIT MOD MDM: CPT

## 2024-08-31 PROCEDURE — 81001 URINALYSIS AUTO W/SCOPE: CPT

## 2024-08-31 PROCEDURE — 87186 SC STD MICRODIL/AGAR DIL: CPT | Performed by: STUDENT IN AN ORGANIZED HEALTH CARE EDUCATION/TRAINING PROGRAM

## 2024-08-31 PROCEDURE — 87086 URINE CULTURE/COLONY COUNT: CPT | Performed by: STUDENT IN AN ORGANIZED HEALTH CARE EDUCATION/TRAINING PROGRAM

## 2024-08-31 PROCEDURE — 99284 EMERGENCY DEPT VISIT MOD MDM: CPT | Performed by: STUDENT IN AN ORGANIZED HEALTH CARE EDUCATION/TRAINING PROGRAM

## 2024-08-31 RX ORDER — SULFAMETHOXAZOLE/TRIMETHOPRIM 800-160 MG
1 TABLET ORAL 2 TIMES DAILY
Qty: 6 TABLET | Refills: 0 | Status: SHIPPED | OUTPATIENT
Start: 2024-08-31 | End: 2024-09-03

## 2024-08-31 RX ORDER — PHENAZOPYRIDINE HYDROCHLORIDE 200 MG/1
200 TABLET, FILM COATED ORAL 3 TIMES DAILY PRN
Qty: 6 TABLET | Refills: 0 | Status: SHIPPED | OUTPATIENT
Start: 2024-08-31

## 2024-08-31 NOTE — ED PROVIDER NOTES
History  Chief Complaint   Patient presents with    Possible UTI     Last night started with urgency and frequency and incontinence. Abdominal pain     53-year-old female.  Patient presents with urinary frequency/urgency, dysuria.  Symptoms began last night.  Denies fever/chills.  Denies flank pain, abdominal pain, nausea/vomiting/diarrhea.  Patient expressing mild bilateral lower back pain.  No known history of kidney stones.      History provided by:  Patient      Prior to Admission Medications   Prescriptions Last Dose Informant Patient Reported? Taking?   tobramycin (Tobrex) 0.3 % SOLN   No No   Sig: Administer 1 drop into the left eye every 4 (four) hours while awake      Facility-Administered Medications: None       History reviewed. No pertinent past medical history.    Past Surgical History:   Procedure Laterality Date    FINGER SURGERY         History reviewed. No pertinent family history.  I have reviewed and agree with the history as documented.    E-Cigarette/Vaping    E-Cigarette Use Never User      E-Cigarette/Vaping Substances     Social History     Tobacco Use    Smoking status: Never    Smokeless tobacco: Never   Vaping Use    Vaping status: Never Used   Substance Use Topics    Alcohol use: Not Currently    Drug use: Not Currently       Review of Systems   Constitutional:  Negative for chills and fever.   Respiratory:  Negative for cough, chest tightness and shortness of breath.    Cardiovascular:  Negative for chest pain.   Gastrointestinal:  Negative for abdominal pain, diarrhea, nausea and vomiting.   Genitourinary:  Positive for difficulty urinating, dysuria, frequency and urgency. Negative for flank pain and pelvic pain.   Musculoskeletal:  Positive for back pain. Negative for arthralgias, neck pain and neck stiffness.   All other systems reviewed and are negative.    Physical Exam  Physical Exam  Vitals and nursing note reviewed.   Constitutional:       General: She is not in acute distress.      Appearance: She is not ill-appearing or toxic-appearing.   HENT:      Head: Normocephalic and atraumatic.      Right Ear: External ear normal.      Left Ear: External ear normal.      Nose: No congestion or rhinorrhea.   Eyes:      General: No scleral icterus.        Right eye: No discharge.         Left eye: No discharge.      Extraocular Movements: Extraocular movements intact.      Conjunctiva/sclera: Conjunctivae normal.   Cardiovascular:      Rate and Rhythm: Normal rate and regular rhythm.      Pulses: Normal pulses.      Heart sounds: Normal heart sounds. No murmur heard.  Pulmonary:      Effort: Pulmonary effort is normal. No respiratory distress.      Breath sounds: Normal breath sounds. No stridor. No wheezing, rhonchi or rales.   Chest:      Chest wall: No tenderness.   Abdominal:      General: Bowel sounds are normal. There is no distension.      Palpations: Abdomen is soft.      Tenderness: There is no abdominal tenderness. There is no right CVA tenderness, left CVA tenderness, guarding or rebound.   Musculoskeletal:         General: Tenderness present.      Right lower leg: No edema.      Left lower leg: No edema.      Comments: Mild tenderness to palpation across the lower back.  No CVA tenderness.   Skin:     General: Skin is warm and dry.      Coloration: Skin is not jaundiced or pale.   Neurological:      General: No focal deficit present.      Mental Status: She is alert and oriented to person, place, and time.   Psychiatric:         Mood and Affect: Mood normal.         Behavior: Behavior normal.         Vital Signs  ED Triage Vitals [08/31/24 1130]   Temperature Pulse Respirations Blood Pressure SpO2   97.5 °F (36.4 °C) 67 20 137/98 99 %      Temp Source Heart Rate Source Patient Position - Orthostatic VS BP Location FiO2 (%)   Temporal Monitor -- -- --      Pain Score       3           Vitals:    08/31/24 1130   BP: 137/98   Pulse: 67         Visual Acuity      ED Medications  Medications -  No data to display    Diagnostic Studies  Results Reviewed       Procedure Component Value Units Date/Time    Urine culture [[id number]] Collected: 08/31/24 1146    Lab Status: In process Specimen: Urine, Clean Catch Updated: 08/31/24 1234    Urine Microscopic [[id number]] Collected: 08/31/24 1146    Lab Status: Final result Specimen: Urine, Clean Catch Updated: 08/31/24 1210     RBC, UA None Seen /hpf      WBC, UA 2-4 /hpf      Epithelial Cells Occasional /hpf      Bacteria, UA Occasional /hpf      AMORPH PHOSPATES Occasional /hpf      Triplep Phos Amarilys, UA Occasional /hpf     UA (URINE) with reflex to Scope [[id number]]  (Abnormal) Collected: 08/31/24 1146    Lab Status: Final result Specimen: Urine, Clean Catch Updated: 08/31/24 1154     Color, UA Yellow     Clarity, UA Clear     Specific Gravity, UA 1.025     pH, UA 7.0     Leukocytes, UA Trace     Nitrite, UA Negative     Protein, UA 30 (1+) mg/dl      Glucose, UA Negative mg/dl      Ketones, UA Negative mg/dl      Urobilinogen, UA 0.2 E.U./dl      Bilirubin, UA Small     Occult Blood, UA Negative               No orders to display          Procedures  Procedures     ED Course  ED Course as of 08/31/24 1657   Sat Aug 31, 2024   1200 Vital signs reviewed.  Patient presenting with urinary frequency/urgency/burning since last night.  Denies flank pain.  Denies fever/chills.  Will obtain urinalysis.   1228 Urinalysis with trace leukocytes.  No other signs of infectious process.  Ureteral calculus unlikely--no signs of hematuria noted on urinalysis.  Urine culture added on. Being that the patient has urinary frequency/urgency/burning, will prescribe a short course of Bactrim along with a course of Pyridium.  Patient denies vaginal bleeding/discharge, risk of STD.  Patient was instructed to follow-up with her primary care provider.  Return precautions discussed.  Stable for discharge.     SBIRT 22yo+      Flowsheet Row Most Recent Value   Initial Alcohol Screen: US  AUDIT-C     1. How often do you have a drink containing alcohol? 0 Filed at: 08/31/2024 1131   2. How many drinks containing alcohol do you have on a typical day you are drinking?  0 Filed at: 08/31/2024 1131   3b. FEMALE Any Age, or MALE 65+: How often do you have 4 or more drinks on one occassion? 0 Filed at: 08/31/2024 1131   Audit-C Score 0 Filed at: 08/31/2024 1131   LORENA: How many times in the past year have you...    Used an illegal drug or used a prescription medication for non-medical reasons? Never Filed at: 08/31/2024 1131          Medical Decision Making  This patient presents with urinary frequency/urgency/burning.   Diagnostic considerations include cystitis, bladder spasms, pyelonephritis, ureteral stone. See ED Course.         Problems Addressed:  Dysuria: acute illness or injury  Urinary frequency: acute illness or injury  Urinary urgency: acute illness or injury    Amount and/or Complexity of Data Reviewed  Labs: ordered. Decision-making details documented in ED Course.    Risk  Prescription drug management.      Disposition  Final diagnoses:   Urinary frequency   Urinary urgency   Dysuria     Time reflects when diagnosis was documented in both MDM as applicable and the Disposition within this note       Time User Action Codes Description Comment    8/31/2024 12:35 PM Benigno Barbour [R35.0] Urinary frequency     8/31/2024 12:35 PM Benigno Barbour [R39.15] Urinary urgency     8/31/2024 12:35 PM Benigno Barbour [R30.0] Dysuria           ED Disposition       ED Disposition   Discharge    Condition   Stable    Date/Time   Sat Aug 31, 2024 1234    Comment   Matilde Miranda discharge to home/self care.                   Follow-up Information    None         Discharge Medication List as of 8/31/2024 12:36 PM        START taking these medications    Details   phenazopyridine (PYRIDIUM) 200 mg tablet Take 1 tablet (200 mg total) by mouth 3 (three) times a day as needed for bladder spasms,  Starting Sat 8/31/2024, Normal      sulfamethoxazole-trimethoprim (BACTRIM DS) 800-160 mg per tablet Take 1 tablet by mouth 2 (two) times a day for 3 days smx-tmp DS (BACTRIM) 800-160 mg tabs (1tab q12 D10), Starting Sat 8/31/2024, Until Tue 9/3/2024, Normal           CONTINUE these medications which have NOT CHANGED    Details   tobramycin (Tobrex) 0.3 % SOLN Administer 1 drop into the left eye every 4 (four) hours while awake, Starting Thu 6/8/2023, Normal             No discharge procedures on file.    PDMP Review       None            ED Provider  Electronically Signed by             Benigno Barbour DO  08/31/24 4233

## 2024-08-31 NOTE — DISCHARGE INSTRUCTIONS
You are being prescribed a course of Bactrim (antibiotic) and Pyridium (antibladder spasm medication).    Please take as directed.  You will be contacted if the urine culture is abnormal.    Pyridium may cause your urine to become orange in color.  Follow-up with your primary care provider.  Return to the emergency department for any concerning signs or symptoms.

## 2024-09-02 LAB
BACTERIA UR CULT: ABNORMAL
BACTERIA UR CULT: ABNORMAL

## 2024-10-07 ENCOUNTER — DOCTOR'S OFFICE (OUTPATIENT)
Dept: URBAN - NONMETROPOLITAN AREA CLINIC 1 | Facility: CLINIC | Age: 54
Setting detail: OPHTHALMOLOGY
End: 2024-10-07
Payer: COMMERCIAL

## 2024-10-07 DIAGNOSIS — H25.13: ICD-10-CM

## 2024-10-07 DIAGNOSIS — H53.2: ICD-10-CM

## 2024-10-07 DIAGNOSIS — G43.101: ICD-10-CM

## 2024-10-07 DIAGNOSIS — H40.001: ICD-10-CM

## 2024-10-07 DIAGNOSIS — H50.52: ICD-10-CM

## 2024-10-07 PROBLEM — H35.40 PERIPAPILLARY ATROPHY ; BOTH EYES: Status: ACTIVE | Noted: 2024-10-07

## 2024-10-07 PROCEDURE — 76514 ECHO EXAM OF EYE THICKNESS: CPT | Performed by: OPHTHALMOLOGY

## 2024-10-07 PROCEDURE — 92083 EXTENDED VISUAL FIELD XM: CPT | Performed by: OPHTHALMOLOGY

## 2024-10-07 PROCEDURE — 92060 SENSORIMOTOR EXAMINATION: CPT | Performed by: OPHTHALMOLOGY

## 2024-10-07 PROCEDURE — 99214 OFFICE O/P EST MOD 30 MIN: CPT | Performed by: OPHTHALMOLOGY

## 2024-10-07 ASSESSMENT — REFRACTION_MANIFEST
OU_VA: 20/20
OD_AXIS: 120
OS_VA1: 20/20
OS_ADD: +2.50
OD_VA2: 20/25+2
OS_VA2: 20/20
OD_ADD: +2.50
OD_VA1: 20/25+2
OS_VPRISM: BU
OS_SPHERE: -3.00
OD_SPHERE: -3.00
OS_CYLINDER: SPH
OD_CYLINDER: -0.25

## 2024-10-07 ASSESSMENT — REFRACTION_CURRENTRX
OS_CYLINDER: 0.00
OS_SPHERE: -2.50
OS_AXIS: 180
OS_CYLINDER: +0.00
OD_VPRISM_DIRECTION: PROGS
OD_VPRISM_DIRECTION: PROGS
OD_CYLINDER: -0.25
OD_CYLINDER: -0.50
OS_ADD: +1.00
OD_OVR_VA: 20/
OS_ADD2: -250
OS_SPHERE: -3.00
OD_OVR_VA: 20/
OD_AXIS: 126
OS_AXIS: 009
OS_AXIS: 090
OD_SPHERE: -3.00
OD_ADD: +2.00
OD_AXIS: 028
OS_VPRISM_DIRECTION: PROGS
OS_SPHERE: -2.25
OD_SPHERE: -2.75
OD_AXIS: 171
OS_OVR_VA: 20/
OS_OVR_VA: 20/
OD_VPRISM_DIRECTION: PROGS
OS_ADD: +2.00
OS_ADD: +2.25
OD_ADD: +2.25
OD_ADD: +1.00
OD_OVR_VA: 20/
OS_CYLINDER: -0.50
OS_VPRISM_DIRECTION: PROGS
OD_CYLINDER: +0.50
OS_VPRISM_DIRECTION: PROGS
OS_OVR_VA: 20/
OD_SPHERE: -3.50

## 2024-10-07 ASSESSMENT — REFRACTION_AUTOREFRACTION
OS_CYLINDER: -0.50
OD_CYLINDER: -0.75
OD_SPHERE: -2.25
OS_AXIS: 159
OS_SPHERE: -2.50
OD_AXIS: 124

## 2024-10-07 ASSESSMENT — KERATOMETRY
OD_K1POWER_DIOPTERS: 44.50
OS_K2POWER_DIOPTERS: 44.75
OS_K1POWER_DIOPTERS: 44.50
OS_AXISANGLE_DEGREES: 067
OD_K2POWER_DIOPTERS: 44.75
OD_AXISANGLE_DEGREES: 081

## 2024-10-07 ASSESSMENT — LID EXAM ASSESSMENTS
OD_BLEPHARITIS: T
OS_BLEPHARITIS: T

## 2024-10-07 ASSESSMENT — PACHYMETRY
OD_CT_UM: 540
OD_CT_CORRECTION: 0
OS_CT_CORRECTION: 0
OS_CT_UM: 546

## 2024-10-07 ASSESSMENT — CONFRONTATIONAL VISUAL FIELD TEST (CVF)
OS_FINDINGS: FULL
OD_FINDINGS: FULL

## 2024-10-07 ASSESSMENT — VISUAL ACUITY
OS_BCVA: 20/25+2
OD_BCVA: 20/20

## 2024-12-09 ENCOUNTER — DOCTOR'S OFFICE (OUTPATIENT)
Dept: URBAN - NONMETROPOLITAN AREA CLINIC 1 | Facility: CLINIC | Age: 54
Setting detail: OPHTHALMOLOGY
End: 2024-12-09
Payer: COMMERCIAL

## 2024-12-09 DIAGNOSIS — H57.12: ICD-10-CM

## 2024-12-09 DIAGNOSIS — S05.02XA: ICD-10-CM

## 2024-12-09 PROCEDURE — 92012 INTRM OPH EXAM EST PATIENT: CPT | Performed by: OPTOMETRIST

## 2024-12-09 ASSESSMENT — CORNEAL TRAUMA - FOREIGN BODY: OS_FOREIGNBODY: ABSENT

## 2024-12-09 ASSESSMENT — REFRACTION_CURRENTRX
OD_AXIS: 028
OS_CYLINDER: +0.00
OS_SPHERE: -2.25
OS_ADD: +1.00
OS_VPRISM_DIRECTION: PROGS
OS_VPRISM_DIRECTION: PROGS
OS_ADD2: -250
OD_CYLINDER: +0.50
OD_ADD: +2.25
OS_CYLINDER: -0.50
OD_SPHERE: -3.00
OD_VPRISM_DIRECTION: PROGS
OD_SPHERE: -3.50
OD_VPRISM_DIRECTION: PROGS
OS_ADD: +2.25
OD_OVR_VA: 20/
OD_SPHERE: -2.75
OS_OVR_VA: 20/
OD_CYLINDER: -0.50
OS_VPRISM_DIRECTION: PROGS
OS_ADD: +2.00
OD_OVR_VA: 20/
OD_ADD: +1.00
OD_ADD: +2.00
OD_CYLINDER: -0.25
OD_VPRISM_DIRECTION: PROGS
OS_OVR_VA: 20/
OS_SPHERE: -2.50
OD_AXIS: 126
OS_SPHERE: -3.00
OD_AXIS: 171
OS_CYLINDER: 0.00
OD_OVR_VA: 20/
OS_AXIS: 009
OS_AXIS: 180
OS_AXIS: 090
OS_OVR_VA: 20/

## 2024-12-09 ASSESSMENT — KERATOMETRY
OS_K1POWER_DIOPTERS: 44.50
OD_K2POWER_DIOPTERS: 44.75
OS_AXISANGLE_DEGREES: 067
OD_AXISANGLE_DEGREES: 081
OS_K2POWER_DIOPTERS: 44.75
OD_K1POWER_DIOPTERS: 44.50

## 2024-12-09 ASSESSMENT — PACHYMETRY
OS_CT_CORRECTION: 1
OS_CT_UM: 520
OD_CT_CORRECTION: 2
OD_CT_UM: 515

## 2024-12-09 ASSESSMENT — REFRACTION_MANIFEST
OS_SPHERE: -3.00
OS_ADD: +2.50
OS_VPRISM: BU
OD_VA2: 20/25+2
OS_VA2: 20/20
OD_ADD: +2.50
OS_VA1: 20/20
OD_SPHERE: -3.00
OU_VA: 20/20
OD_AXIS: 120
OD_VA1: 20/25+2
OD_CYLINDER: -0.25
OS_CYLINDER: SPH

## 2024-12-09 ASSESSMENT — LID EXAM ASSESSMENTS
OD_BLEPHARITIS: T
OS_BLEPHARITIS: T

## 2024-12-09 ASSESSMENT — REFRACTION_AUTOREFRACTION
OS_CYLINDER: -0.50
OS_SPHERE: -2.50
OD_SPHERE: -2.25
OD_CYLINDER: -0.75
OD_AXIS: 124
OS_AXIS: 159

## 2024-12-09 ASSESSMENT — CORNEAL TRAUMA: OS_TRAUMA: CLOCK

## 2024-12-09 ASSESSMENT — CONFRONTATIONAL VISUAL FIELD TEST (CVF)
OD_FINDINGS: FULL
OS_FINDINGS: FULL

## 2024-12-09 ASSESSMENT — VISUAL ACUITY
OS_BCVA: 20/20
OD_BCVA: 20/25-2

## 2024-12-09 ASSESSMENT — TONOMETRY
OD_IOP_MMHG: 14
OS_IOP_MMHG: 14

## 2024-12-09 ASSESSMENT — CORNEAL TRAUMA - ABRASION: OS_ABRASION: PRESENT

## 2025-04-09 ENCOUNTER — DOCTOR'S OFFICE (OUTPATIENT)
Dept: URBAN - NONMETROPOLITAN AREA CLINIC 1 | Facility: CLINIC | Age: 55
Setting detail: OPHTHALMOLOGY
End: 2025-04-09
Payer: COMMERCIAL

## 2025-04-09 DIAGNOSIS — H40.013: ICD-10-CM

## 2025-04-09 DIAGNOSIS — H40.043: ICD-10-CM

## 2025-04-09 DIAGNOSIS — H50.52: ICD-10-CM

## 2025-04-09 DIAGNOSIS — H53.2: ICD-10-CM

## 2025-04-09 PROCEDURE — 99214 OFFICE O/P EST MOD 30 MIN: CPT | Performed by: OPHTHALMOLOGY

## 2025-04-09 PROCEDURE — 92133 CPTRZD OPH DX IMG PST SGM ON: CPT | Performed by: OPHTHALMOLOGY

## 2025-04-09 ASSESSMENT — REFRACTION_MANIFEST
OS_ADD: +2.50
OS_VPRISM: BU
OS_VA1: 20/20
OD_VA2: 20/25+2
OD_SPHERE: -3.00
OS_SPHERE: -3.00
OD_ADD: +2.50
OD_VA1: 20/25+2
OD_AXIS: 120
OS_VA2: 20/20
OU_VA: 20/20
OS_CYLINDER: SPH
OD_CYLINDER: -0.25

## 2025-04-09 ASSESSMENT — REFRACTION_CURRENTRX
OS_SPHERE: -2.50
OS_AXIS: 180
OD_ADD: +2.25
OD_VPRISM_DIRECTION: PROGS
OS_SPHERE: -3.00
OS_ADD: +2.25
OS_CYLINDER: -0.50
OD_OVR_VA: 20/
OD_VPRISM_DIRECTION: PROGS
OS_CYLINDER: +0.00
OS_OVR_VA: 20/
OS_SPHERE: -2.25
OS_OVR_VA: 20/
OD_AXIS: 126
OD_ADD: +1.00
OS_AXIS: 090
OD_CYLINDER: +0.50
OS_VPRISM_DIRECTION: PROGS
OD_AXIS: 035
OS_VPRISM_DIRECTION: PROGS
OD_OVR_VA: 20/
OD_CYLINDER: -0.25
OS_CYLINDER: 0.00
OS_ADD2: -250
OD_VPRISM_DIRECTION: PROGS
OD_SPHERE: -2.75
OS_ADD: +2.00
OS_OVR_VA: 20/
OD_ADD: +2.00
OD_SPHERE: -3.00
OS_AXIS: 009
OS_VPRISM_DIRECTION: PROGS
OD_SPHERE: -3.50
OD_CYLINDER: -0.50
OD_OVR_VA: 20/
OS_ADD: +1.00
OD_AXIS: 171

## 2025-04-09 ASSESSMENT — KERATOMETRY
OD_AXISANGLE_DEGREES: 081
OS_AXISANGLE_DEGREES: 067
OS_K2POWER_DIOPTERS: 44.75
OD_K1POWER_DIOPTERS: 44.50
OD_K2POWER_DIOPTERS: 44.75
OS_K1POWER_DIOPTERS: 44.50

## 2025-04-09 ASSESSMENT — VISUAL ACUITY
OD_BCVA: 20/25+1
OS_BCVA: 20/25+2

## 2025-04-09 ASSESSMENT — CORNEAL TRAUMA: OS_TRAUMA: CLOCK

## 2025-04-09 ASSESSMENT — CORNEAL TRAUMA - ABRASION: OS_ABRASION: PRESENT

## 2025-04-09 ASSESSMENT — LID EXAM ASSESSMENTS
OS_BLEPHARITIS: T
OD_BLEPHARITIS: T

## 2025-04-09 ASSESSMENT — REFRACTION_AUTOREFRACTION
OS_CYLINDER: +0.75
OS_SPHERE: -2.75
OD_SPHERE: -3.00
OD_AXIS: 065
OS_AXIS: 060
OD_CYLINDER: +0.25

## 2025-04-09 ASSESSMENT — CORNEAL TRAUMA - FOREIGN BODY: OS_FOREIGNBODY: ABSENT

## 2025-04-25 ENCOUNTER — RX ONLY (RX ONLY)
Age: 55
End: 2025-04-25

## 2025-04-25 ENCOUNTER — DOCTOR'S OFFICE (OUTPATIENT)
Dept: URBAN - NONMETROPOLITAN AREA CLINIC 1 | Facility: CLINIC | Age: 55
Setting detail: OPHTHALMOLOGY
End: 2025-04-25
Payer: COMMERCIAL

## 2025-04-25 DIAGNOSIS — H52.4: ICD-10-CM

## 2025-04-25 DIAGNOSIS — H52.13: ICD-10-CM

## 2025-04-25 PROBLEM — H40.013 GLAUCOMA SUSPECT, LOW RISK; BOTH EYES: Status: ACTIVE | Noted: 2025-04-09

## 2025-04-25 PROCEDURE — 92012 INTRM OPH EXAM EST PATIENT: CPT | Performed by: OPTOMETRIST

## 2025-04-25 ASSESSMENT — REFRACTION_MANIFEST
OS_ADD: +2.50
OS_VA1: 20/20
OS_VPRISM: BU
OS_VA2: 20/20
OD_VA2: 20/25+2
OD_AXIS: 120
OD_VA1: 20/25+2
OS_SPHERE: -3.00
OD_ADD: +2.50
OD_CYLINDER: -0.25
OU_VA: 20/20
OS_CYLINDER: SPH
OD_SPHERE: -3.00

## 2025-04-25 ASSESSMENT — REFRACTION_CURRENTRX
OS_OVR_VA: 20/
OD_VPRISM_DIRECTION: PROGS
OD_VPRISM_DIRECTION: SV
OS_VPRISM_DIRECTION: PROGS
OS_VPRISM_DIRECTION: PROGS
OS_AXIS: 009
OS_SPHERE: -2.25
OS_CYLINDER: 0.00
OD_OVR_VA: 20/
OD_SPHERE: -3.00
OD_ADD: +2.25
OD_AXIS: 126
OD_AXIS: 171
OD_OVR_VA: 20/
OS_VPRISM_DIRECTION: SV
OS_OVR_VA: 20/
OD_ADD: +1.00
OD_CYLINDER: -0.50
OS_CYLINDER: -0.50
OS_ADD: +1.00
OD_CYLINDER: -0.25
OD_OVR_VA: 20/
OD_CYLINDER: -0.50
OD_AXIS: 119
OD_SPHERE: -2.75
OD_VPRISM_DIRECTION: PROGS
OS_AXIS: 180
OS_SPHERE: -2.50
OS_SPHERE: -2.50
OS_ADD2: -250
OS_OVR_VA: 20/
OD_SPHERE: -2.75
OS_AXIS: 180
OS_CYLINDER: 0.00
OS_ADD: +2.25

## 2025-04-25 ASSESSMENT — LID EXAM ASSESSMENTS
OS_BLEPHARITIS: T
OD_BLEPHARITIS: T

## 2025-04-25 ASSESSMENT — REFRACTION_AUTOREFRACTION
OD_SPHERE: -2.75
OS_SPHERE: -2.25
OS_AXIS: 152
OD_AXIS: 151
OD_CYLINDER: -0.50
OS_CYLINDER: -0.50

## 2025-04-25 ASSESSMENT — VISUAL ACUITY
OD_BCVA: 20/25-1
OS_BCVA: 20/20-2

## 2025-04-25 ASSESSMENT — CONFRONTATIONAL VISUAL FIELD TEST (CVF)
OD_FINDINGS: FULL
OS_FINDINGS: FULL

## 2025-04-25 ASSESSMENT — CORNEAL TRAUMA: OS_TRAUMA: CLOCK

## 2025-04-25 ASSESSMENT — KERATOMETRY
OS_AXISANGLE_DEGREES: 067
OS_K2POWER_DIOPTERS: 44.75
OD_K1POWER_DIOPTERS: 44.50
OD_K2POWER_DIOPTERS: 44.75
OS_K1POWER_DIOPTERS: 44.50
OD_AXISANGLE_DEGREES: 081

## 2025-04-25 ASSESSMENT — CORNEAL TRAUMA - FOREIGN BODY: OS_FOREIGNBODY: ABSENT

## 2025-04-25 ASSESSMENT — CORNEAL TRAUMA - ABRASION: OS_ABRASION: PRESENT
